# Patient Record
Sex: FEMALE | Race: WHITE | NOT HISPANIC OR LATINO | Employment: FULL TIME | URBAN - METROPOLITAN AREA
[De-identification: names, ages, dates, MRNs, and addresses within clinical notes are randomized per-mention and may not be internally consistent; named-entity substitution may affect disease eponyms.]

---

## 2023-12-27 PROBLEM — R45.7 CAREGIVER STRESS SYNDROME: Status: ACTIVE | Noted: 2023-05-24

## 2023-12-27 PROBLEM — K60.2 ANAL FISSURE: Status: ACTIVE | Noted: 2023-05-24

## 2023-12-27 PROBLEM — J45.20 MILD INTERMITTENT ASTHMA WITHOUT COMPLICATION (HHS-HCC): Status: ACTIVE | Noted: 2019-04-03

## 2023-12-27 PROBLEM — F43.23 SITUATIONAL MIXED ANXIETY AND DEPRESSIVE DISORDER: Status: ACTIVE | Noted: 2023-05-24

## 2023-12-27 PROBLEM — F43.89 CAREGIVER STRESS SYNDROME: Status: ACTIVE | Noted: 2023-05-24

## 2024-01-05 ENCOUNTER — OFFICE VISIT (OUTPATIENT)
Dept: PRIMARY CARE | Facility: CLINIC | Age: 54
End: 2024-01-05
Payer: COMMERCIAL

## 2024-01-05 ENCOUNTER — LAB (OUTPATIENT)
Dept: LAB | Facility: LAB | Age: 54
End: 2024-01-05
Payer: COMMERCIAL

## 2024-01-05 VITALS
HEIGHT: 66 IN | BODY MASS INDEX: 47.09 KG/M2 | WEIGHT: 293 LBS | DIASTOLIC BLOOD PRESSURE: 84 MMHG | RESPIRATION RATE: 18 BRPM | TEMPERATURE: 97.1 F | SYSTOLIC BLOOD PRESSURE: 122 MMHG | OXYGEN SATURATION: 96 % | HEART RATE: 74 BPM

## 2024-01-05 DIAGNOSIS — F32.A ANXIETY AND DEPRESSION: ICD-10-CM

## 2024-01-05 DIAGNOSIS — Z00.00 ANNUAL PHYSICAL EXAM: ICD-10-CM

## 2024-01-05 DIAGNOSIS — Z12.11 COLON CANCER SCREENING: ICD-10-CM

## 2024-01-05 DIAGNOSIS — Z23 IMMUNIZATION DUE: ICD-10-CM

## 2024-01-05 DIAGNOSIS — I10 ESSENTIAL HYPERTENSION: ICD-10-CM

## 2024-01-05 DIAGNOSIS — E66.01 CLASS 3 SEVERE OBESITY DUE TO EXCESS CALORIES WITH SERIOUS COMORBIDITY AND BODY MASS INDEX (BMI) OF 45.0 TO 49.9 IN ADULT (MULTI): ICD-10-CM

## 2024-01-05 DIAGNOSIS — F41.9 ANXIETY AND DEPRESSION: ICD-10-CM

## 2024-01-05 DIAGNOSIS — Z00.00 ANNUAL PHYSICAL EXAM: Primary | ICD-10-CM

## 2024-01-05 PROBLEM — E66.813 CLASS 3 SEVERE OBESITY DUE TO EXCESS CALORIES WITH SERIOUS COMORBIDITY AND BODY MASS INDEX (BMI) OF 45.0 TO 49.9 IN ADULT: Status: ACTIVE | Noted: 2024-01-05

## 2024-01-05 LAB
ALBUMIN SERPL BCP-MCNC: 4 G/DL (ref 3.4–5)
ALP SERPL-CCNC: 56 U/L (ref 33–110)
ALT SERPL W P-5'-P-CCNC: 24 U/L (ref 7–45)
ANION GAP SERPL CALC-SCNC: 10 MMOL/L (ref 10–20)
AST SERPL W P-5'-P-CCNC: 21 U/L (ref 9–39)
BILIRUB SERPL-MCNC: 0.4 MG/DL (ref 0–1.2)
BUN SERPL-MCNC: 17 MG/DL (ref 6–23)
CALCIUM SERPL-MCNC: 9.1 MG/DL (ref 8.6–10.3)
CHLORIDE SERPL-SCNC: 104 MMOL/L (ref 98–107)
CHOLEST SERPL-MCNC: 199 MG/DL (ref 0–199)
CHOLESTEROL/HDL RATIO: 4
CO2 SERPL-SCNC: 28 MMOL/L (ref 21–32)
CREAT SERPL-MCNC: 0.76 MG/DL (ref 0.5–1.05)
ERYTHROCYTE [DISTWIDTH] IN BLOOD BY AUTOMATED COUNT: 11.9 % (ref 11.5–14.5)
EST. AVERAGE GLUCOSE BLD GHB EST-MCNC: 91 MG/DL
GFR SERPL CREATININE-BSD FRML MDRD: >90 ML/MIN/1.73M*2
GLUCOSE SERPL-MCNC: 94 MG/DL (ref 74–99)
HBA1C MFR BLD: 4.8 %
HCT VFR BLD AUTO: 37.8 % (ref 36–46)
HDLC SERPL-MCNC: 49.8 MG/DL
HGB BLD-MCNC: 12.7 G/DL (ref 12–16)
LDLC SERPL CALC-MCNC: 129 MG/DL
MCH RBC QN AUTO: 33 PG (ref 26–34)
MCHC RBC AUTO-ENTMCNC: 33.6 G/DL (ref 32–36)
MCV RBC AUTO: 98 FL (ref 80–100)
NON HDL CHOLESTEROL: 149 MG/DL (ref 0–149)
NRBC BLD-RTO: 0 /100 WBCS (ref 0–0)
PLATELET # BLD AUTO: 236 X10*3/UL (ref 150–450)
POTASSIUM SERPL-SCNC: 4.4 MMOL/L (ref 3.5–5.3)
PROT SERPL-MCNC: 6.6 G/DL (ref 6.4–8.2)
RBC # BLD AUTO: 3.85 X10*6/UL (ref 4–5.2)
SODIUM SERPL-SCNC: 138 MMOL/L (ref 136–145)
TRIGL SERPL-MCNC: 99 MG/DL (ref 0–149)
VLDL: 20 MG/DL (ref 0–40)
WBC # BLD AUTO: 7.4 X10*3/UL (ref 4.4–11.3)

## 2024-01-05 PROCEDURE — 80061 LIPID PANEL: CPT

## 2024-01-05 PROCEDURE — 36415 COLL VENOUS BLD VENIPUNCTURE: CPT

## 2024-01-05 PROCEDURE — 1036F TOBACCO NON-USER: CPT | Performed by: PHYSICIAN ASSISTANT

## 2024-01-05 PROCEDURE — 3008F BODY MASS INDEX DOCD: CPT | Performed by: PHYSICIAN ASSISTANT

## 2024-01-05 PROCEDURE — 85027 COMPLETE CBC AUTOMATED: CPT

## 2024-01-05 PROCEDURE — 90480 ADMN SARSCOV2 VAC 1/ONLY CMP: CPT | Performed by: PHYSICIAN ASSISTANT

## 2024-01-05 PROCEDURE — 3074F SYST BP LT 130 MM HG: CPT | Performed by: PHYSICIAN ASSISTANT

## 2024-01-05 PROCEDURE — 80053 COMPREHEN METABOLIC PANEL: CPT

## 2024-01-05 PROCEDURE — 99386 PREV VISIT NEW AGE 40-64: CPT | Performed by: PHYSICIAN ASSISTANT

## 2024-01-05 PROCEDURE — 91322 SARSCOV2 VAC 50 MCG/0.5ML IM: CPT | Performed by: PHYSICIAN ASSISTANT

## 2024-01-05 PROCEDURE — 83036 HEMOGLOBIN GLYCOSYLATED A1C: CPT

## 2024-01-05 PROCEDURE — 3079F DIAST BP 80-89 MM HG: CPT | Performed by: PHYSICIAN ASSISTANT

## 2024-01-05 RX ORDER — LISINOPRIL 40 MG/1
40 TABLET ORAL
Qty: 90 TABLET | Refills: 1 | Status: SHIPPED | OUTPATIENT
Start: 2024-01-05 | End: 2024-06-03

## 2024-01-05 RX ORDER — SPIRONOLACTONE 100 MG/1
100 TABLET, FILM COATED ORAL
COMMUNITY
Start: 2023-04-07 | End: 2024-01-05 | Stop reason: SDUPTHER

## 2024-01-05 RX ORDER — LISINOPRIL 40 MG/1
40 TABLET ORAL
COMMUNITY
Start: 2023-04-07 | End: 2024-01-05 | Stop reason: SDUPTHER

## 2024-01-05 RX ORDER — METOPROLOL SUCCINATE 200 MG/1
400 TABLET, EXTENDED RELEASE ORAL DAILY
Qty: 180 TABLET | Refills: 1 | Status: SHIPPED | OUTPATIENT
Start: 2024-01-05 | End: 2024-06-03

## 2024-01-05 RX ORDER — SPIRONOLACTONE 100 MG/1
100 TABLET, FILM COATED ORAL
Qty: 90 TABLET | Refills: 1 | Status: SHIPPED | OUTPATIENT
Start: 2024-01-05 | End: 2024-06-03

## 2024-01-05 RX ORDER — SPIRONOLACTONE 100 MG/1
100 TABLET, FILM COATED ORAL DAILY
COMMUNITY
End: 2024-01-05 | Stop reason: SDUPTHER

## 2024-01-05 RX ORDER — AMLODIPINE BESYLATE 10 MG/1
10 TABLET ORAL
Qty: 90 TABLET | Refills: 1 | Status: SHIPPED | OUTPATIENT
Start: 2024-01-05 | End: 2024-06-03

## 2024-01-05 RX ORDER — SPIRONOLACTONE 100 MG/1
100 TABLET, FILM COATED ORAL DAILY
Qty: 30 TABLET | Refills: 0 | Status: SHIPPED | OUTPATIENT
Start: 2024-01-05 | End: 2024-01-05 | Stop reason: SDUPTHER

## 2024-01-05 RX ORDER — ALBUTEROL SULFATE 90 UG/1
2 AEROSOL, METERED RESPIRATORY (INHALATION) EVERY 6 HOURS PRN
COMMUNITY
Start: 2019-04-03

## 2024-01-05 RX ORDER — AMLODIPINE BESYLATE 10 MG/1
10 TABLET ORAL
COMMUNITY
Start: 2023-04-07 | End: 2024-01-05 | Stop reason: SDUPTHER

## 2024-01-05 RX ORDER — BISMUTH SUBSALICYLATE 262 MG
1 TABLET,CHEWABLE ORAL DAILY
COMMUNITY

## 2024-01-05 RX ORDER — METOPROLOL SUCCINATE 200 MG/1
2 TABLET, EXTENDED RELEASE ORAL DAILY
COMMUNITY
Start: 2023-04-07 | End: 2024-01-05 | Stop reason: SDUPTHER

## 2024-01-05 ASSESSMENT — ENCOUNTER SYMPTOMS
DYSPHORIC MOOD: 1
CONSTIPATION: 1
NERVOUS/ANXIOUS: 1

## 2024-01-05 NOTE — ASSESSMENT & PLAN NOTE
- Current weight: 133 kg (294 lb)  - Weight loss needed to achieve BMI 25: 140.6 Lbs  - Weight loss needed to achieve BMI 30: 109.9 Lbs  - Discussed at length today - recommended strategies for weight loss. Advised seated exercise regimen. Advised dietary modification - reduce caloric intake.   - Recommended try her best for the next 3 months with lifestyle modifications then follow up with me for weight check - we can discuss medication options at that time   - Pt is agreeable to this plan

## 2024-01-05 NOTE — ASSESSMENT & PLAN NOTE
- Current stressors: taking care of her mom with dementia - stuck in the house a lot, recent breakup with partner of 10 years.   - Sxs are very bothersome   - Nervous to try medications due to side effect concerns   - She is open to counseling but would prefer virtually since she has to be home with her mom.   - Referral placed to our behavioral health team/ Jacy for counseling

## 2024-01-05 NOTE — PROGRESS NOTES
"Subjective   Patient ID: Phyllis Holbrook is a 53 y.o. female who presents for Establish Care (New pt here today to [facility]; previously seen [facility] doctors, states has had numerous and then they leave. Pt needing refills-pending; the Spironolactone needs to go to both mailway and local-pening for both. ).    HPI     Preventive:   - Lives at home in [place] with mom (dementia) and son - it's ok. 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 Drinks water, also coffee, one diet soda a day at dinner.   - Exercise: sedentary   - Sexual hx: not right now - broke up with partner   - Alcohol: infrequent      Anal fissure   - due to constipation  - is finally starting to resolve     Anxiety/ depression:  - Tried Wellbutrin in the past - d/c'd due to constipation   - Tried another medicine in the past but can't remember name - no side effect but wanted it to work better than it did   - Tried counseling in past years but not recently - with her mom it's harder     Review of Systems   Gastrointestinal:  Positive for constipation.   Psychiatric/Behavioral:  Positive for dysphoric mood. The patient is nervous/anxious.        Objective   /84   Pulse 74   Temp 36.2 °C (97.1 °F)   Resp 18   Ht 1.67 m (5' 5.75\")   Wt 133 kg (294 lb)   SpO2 96%   BMI 47.81 kg/m²     Physical Exam  Constitutional:       General: She is not in acute distress.     Appearance: She is obese.   HENT:      Head: Normocephalic.      Right Ear: Tympanic membrane and ear canal normal.      Left Ear: Tympanic membrane and ear canal normal.      Nose: Nose normal.      Mouth/Throat:      Mouth: Mucous membranes are moist.      " Pharynx: Oropharynx is clear.   Eyes:      Extraocular Movements: Extraocular movements intact.      Conjunctiva/sclera: Conjunctivae normal.      Pupils: Pupils are equal, round, and reactive to light.   Cardiovascular:      Rate and Rhythm: Normal rate and regular rhythm.      Pulses: Normal pulses.      Heart sounds: No murmur heard.  Pulmonary:      Effort: Pulmonary effort is normal.      Breath sounds: Normal breath sounds. No wheezing, rhonchi or rales.   Abdominal:      General: Bowel sounds are normal. There is no distension.      Palpations: Abdomen is soft. There is no mass.      Tenderness: There is no abdominal tenderness. There is no guarding.   Musculoskeletal:         General: Normal range of motion.      Cervical back: Neck supple.   Lymphadenopathy:      Cervical: No cervical adenopathy.   Skin:     General: Skin is warm and dry.      Findings: No lesion or rash.   Neurological:      General: No focal deficit present.      Mental Status: She is alert.      Gait: Gait normal.   Psychiatric:         Mood and Affect: Mood normal.       Assessment/Plan     Problem List Items Addressed This Visit       Essential hypertension    Overview     - Has been stable and well controlled on amlodipine 10 mg, lisinopril 40 mg, metoprolol  mg and spironolactone 100 mg          Current Assessment & Plan     - Continue current tx plan          Relevant Medications    spironolactone (Aldactone) 100 mg tablet    metoprolol succinate XL (Toprol-XL) 200 mg 24 hr tablet    lisinopril 40 mg tablet    amLODIPine (Norvasc) 10 mg tablet    Other Relevant Orders    CBC (Completed)    Comprehensive Metabolic Panel    Hemoglobin A1C    Lipid Panel    Anxiety and depression    Overview     - Historical med: Wellbutrin (constipation)          Current Assessment & Plan     - Current stressors: taking care of her mom with dementia - stuck in the house a lot, recent breakup with partner of 10 years.   - Sxs are very bothersome    - Nervous to try medications due to side effect concerns   - She is open to counseling but would prefer virtually since she has to be home with her mom.   - Referral placed to our behavioral health team/ Jacy for counseling            Relevant Orders    Follow Up In Advanced Primary Care - Behavioral Health Collaborative Care CoCM    Class 3 severe obesity due to excess calories with serious comorbidity and body mass index (BMI) of 45.0 to 49.9 in adult (CMS/[facility])    Current Assessment & Plan     - Current weight: 133 kg (294 lb)  - Weight loss needed to achieve BMI 25: 140.6 Lbs  - Weight loss needed to achieve BMI 30: 109.9 Lbs  - Discussed at length today - recommended strategies for weight loss. Advised seated exercise regimen. Advised dietary modification - reduce caloric intake.   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 Increase healthy alternatives, whole grains, fruits vegetables.  - Encouraged to take daily multivitamin.    - Discussed EXERCISE -   Recommended weight training for bone health and 30 minutes of  cardiovascular exercise 5-7 days a week.  - Encouraged pt to get yearly eye and dental exams          Relevant Orders    CBC (Completed)    Comprehensive Metabolic Panel    Hemoglobin A1C    Lipid Panel     Other Visit Diagnoses       Colon cancer screening        Relevant Orders    Colonoscopy Screening; Average Risk Patient    Immunization due        Relevant Orders    Moderna COVID-19 vaccine, [id number], monovalent, age 12 years and older, (50mcg/0.5mL) (Completed)

## 2024-01-05 NOTE — ASSESSMENT & PLAN NOTE
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 Increase healthy alternatives, whole grains, fruits vegetables.  - Encouraged to take daily multivitamin.    - Discussed EXERCISE -   Recommended weight training for bone health and 30 minutes of cardiovascular exercise 5-7 days a week.  - Encouraged pt to get yearly eye and dental exams

## 2024-02-01 ENCOUNTER — TELEPHONE (OUTPATIENT)
Dept: PRIMARY CARE | Facility: CLINIC | Age: 54
End: 2024-02-01
Payer: COMMERCIAL

## 2024-02-01 NOTE — PROGRESS NOTES
Writer outreached pt regarding their referral to Collaborative Care. Explained program and answered pt's questions. Pt requested to schedule future initial assessment. Pt is scheduled for Monday March 25th at 9:30am via phone.

## 2024-03-25 ENCOUNTER — SOCIAL WORK (OUTPATIENT)
Dept: PRIMARY CARE | Facility: CLINIC | Age: 54
End: 2024-03-25
Payer: COMMERCIAL

## 2024-03-25 DIAGNOSIS — F33.9 MAJOR DEPRESSIVE DISORDER, RECURRENT EPISODE WITH ANXIOUS DISTRESS (CMS-HCC): Primary | ICD-10-CM

## 2024-03-25 ASSESSMENT — ANXIETY QUESTIONNAIRES
GAD7 TOTAL SCORE: 3
1. FEELING NERVOUS, ANXIOUS, OR ON EDGE: MORE THAN HALF THE DAYS
2. NOT BEING ABLE TO STOP OR CONTROL WORRYING: NOT AT ALL
6. BECOMING EASILY ANNOYED OR IRRITABLE: SEVERAL DAYS
5. BEING SO RESTLESS THAT IT IS HARD TO SIT STILL: NOT AT ALL
7. FEELING AFRAID AS IF SOMETHING AWFUL MIGHT HAPPEN: NOT AT ALL
3. WORRYING TOO MUCH ABOUT DIFFERENT THINGS: NOT AT ALL
IF YOU CHECKED OFF ANY PROBLEMS ON THIS QUESTIONNAIRE, HOW DIFFICULT HAVE THESE PROBLEMS MADE IT FOR YOU TO DO YOUR WORK, TAKE CARE OF THINGS AT HOME, OR GET ALONG WITH OTHER PEOPLE: NOT DIFFICULT AT ALL
4. TROUBLE RELAXING: NOT AT ALL

## 2024-03-25 ASSESSMENT — PATIENT HEALTH QUESTIONNAIRE - PHQ9
9. THOUGHTS THAT YOU WOULD BE BETTER OFF DEAD, OR OF HURTING YOURSELF: NOT AT ALL
3. TROUBLE FALLING OR STAYING ASLEEP: MORE THAN HALF THE DAYS
7. TROUBLE CONCENTRATING ON THINGS, SUCH AS READING THE NEWSPAPER OR WATCHING TELEVISION: MORE THAN HALF THE DAYS
SUM OF ALL RESPONSES TO PHQ QUESTIONS 1-9: 13
SUM OF ALL RESPONSES TO PHQ9 QUESTIONS 1 & 2: 2
4. FEELING TIRED OR HAVING LITTLE ENERGY: NEARLY EVERY DAY
2. FEELING DOWN, DEPRESSED OR HOPELESS: SEVERAL DAYS
10. IF YOU CHECKED OFF ANY PROBLEMS, HOW DIFFICULT HAVE THESE PROBLEMS MADE IT FOR YOU TO DO YOUR WORK, TAKE CARE OF THINGS AT HOME, OR GET ALONG WITH OTHER PEOPLE: VERY DIFFICULT
5. POOR APPETITE OR OVEREATING: SEVERAL DAYS
8. MOVING OR SPEAKING SO SLOWLY THAT OTHER PEOPLE COULD HAVE NOTICED. OR THE OPPOSITE, BEING SO FIGETY OR RESTLESS THAT YOU HAVE BEEN MOVING AROUND A LOT MORE THAN USUAL: NOT AT ALL
1. LITTLE INTEREST OR PLEASURE IN DOING THINGS: SEVERAL DAYS
6. FEELING BAD ABOUT YOURSELF - OR THAT YOU ARE A FAILURE OR HAVE LET YOURSELF OR YOUR FAMILY DOWN: NEARLY EVERY DAY
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
## 2024-03-30 ENCOUNTER — DOCUMENTATION (OUTPATIENT)
Dept: PRIMARY CARE | Facility: CLINIC | Age: 54
End: 2024-03-30
Payer: COMMERCIAL

## 2024-03-30 DIAGNOSIS — F41.9 ANXIETY AND DEPRESSION: Primary | ICD-10-CM

## 2024-03-30 DIAGNOSIS — F32.A ANXIETY AND DEPRESSION: Primary | ICD-10-CM

## 2024-03-30 PROCEDURE — 99492 1ST PSYC COLLAB CARE MGMT: CPT | Performed by: FAMILY MEDICINE

## 2024-04-04 ENCOUNTER — DOCUMENTATION (OUTPATIENT)
Dept: BEHAVIORAL HEALTH | Facility: CLINIC | Age: 54
End: 2024-04-04
Payer: COMMERCIAL

## 2024-04-04 NOTE — PROGRESS NOTES
[facility] Psychiatry Consult Note     Phyllis Holbrook is a 53 y.o., referred to Collaborative Care for symptoms of depression and anxiety in setting of ending romantic relationship and taking care of mother whose health is worsening. I have reviewed the patient with the behavioral health manager and reviewed the patient's electronic record.    Past Meds:  Paxil 20 years ago and felt it worked to help with depression 20 years ago  Wellbutrin XL caused severe constipation    Recommendations:   Start sertraline 50mg daily for depression, can increase by 50mg daily every 4 weeks to max of 200mg or until symptoms have resolved      Patient Health Questionnaire-9 Score: 13 (3/25/2024  9:41 AM)  ALEX-7 Total Score: 3 (3/25/2024  9:49 AM)      The above treatment considerations and suggestions are based on consultations with the patient's care manager and a review of information available in the electronic medical record. I have not personally examined the patient. All recommendations should be implemented with consideration of the patient's relevant prior history and current clinical status. Please feel free to call me with any questions about the care of this patient.

## 2024-04-05 ENCOUNTER — OFFICE VISIT (OUTPATIENT)
Dept: PRIMARY CARE | Facility: CLINIC | Age: 54
End: 2024-04-05
Payer: COMMERCIAL

## 2024-04-05 VITALS
DIASTOLIC BLOOD PRESSURE: 72 MMHG | HEART RATE: 75 BPM | TEMPERATURE: 96 F | SYSTOLIC BLOOD PRESSURE: 102 MMHG | WEIGHT: 291.4 LBS | RESPIRATION RATE: 16 BRPM | BODY MASS INDEX: 46.83 KG/M2 | OXYGEN SATURATION: 97 % | HEIGHT: 66 IN

## 2024-04-05 DIAGNOSIS — F41.9 ANXIETY AND DEPRESSION: ICD-10-CM

## 2024-04-05 DIAGNOSIS — E66.01 CLASS 3 SEVERE OBESITY DUE TO EXCESS CALORIES WITH SERIOUS COMORBIDITY AND BODY MASS INDEX (BMI) OF 45.0 TO 49.9 IN ADULT (MULTI): Primary | ICD-10-CM

## 2024-04-05 DIAGNOSIS — F32.A ANXIETY AND DEPRESSION: ICD-10-CM

## 2024-04-05 PROCEDURE — 1036F TOBACCO NON-USER: CPT | Performed by: PHYSICIAN ASSISTANT

## 2024-04-05 PROCEDURE — 99213 OFFICE O/P EST LOW 20 MIN: CPT | Performed by: PHYSICIAN ASSISTANT

## 2024-04-05 PROCEDURE — 3078F DIAST BP <80 MM HG: CPT | Performed by: PHYSICIAN ASSISTANT

## 2024-04-05 PROCEDURE — 3008F BODY MASS INDEX DOCD: CPT | Performed by: PHYSICIAN ASSISTANT

## 2024-04-05 PROCEDURE — 3074F SYST BP LT 130 MM HG: CPT | Performed by: PHYSICIAN ASSISTANT

## 2024-04-05 RX ORDER — PAROXETINE 10 MG/1
10 TABLET, FILM COATED ORAL EVERY MORNING
Qty: 30 TABLET | Refills: 1 | Status: SHIPPED | OUTPATIENT
Start: 2024-04-05 | End: 2024-06-06

## 2024-04-05 ASSESSMENT — ENCOUNTER SYMPTOMS: DYSPHORIC MOOD: 1

## 2024-04-05 NOTE — ASSESSMENT & PLAN NOTE
- Follow up visit today - she has started seeing Jacy - had her first visit and feels great about it! Good connection with Jacy.   - I reviewed Dr. Swanson suggestion with her of starting Sertarline - she is too nervous to start this.   - She does recall taking Paxil many years ago without any side effects and would like to try this again   - Rx sent for Paxil 10 mg daily and did warn her about potential for it to wear off in the evenings and to let me know if she's noticing this   - Recommend she continue to follow up with Jacy.   - We can taper up the Paxil every 4-6 weeks as needed.

## 2024-04-05 NOTE — PATIENT INSTRUCTIONS
Please give your insurance a call and ask if they cover any of the below:  Wegovy   Zepbound  Saxend (daily injection)

## 2024-04-05 NOTE — ASSESSMENT & PLAN NOTE
- Current weight: 132 kg (291 lb 6.4 oz)  - Weight change since last visit (-) denotes wt loss -2.6 lbs   - Weight loss needed to achieve BMI 25: 138 Lbs  - Weight loss needed to achieve BMI 30: 107.3 Lbs  - I congratulated the pt on the 2.6lb weight loss.   - Pt admits she is still struggling greatly to eat healthy and exercise. Lifestyle modifications were strongly encouraged  - Referral placed to nutritionist for further discussion of diet   - Discussed weight loss medications, especially GLP1-ra's e.g. Wegovy, Saxenda, Zepbound - pt will call her insurance company to see if any of these are covered.

## 2024-04-05 NOTE — PROGRESS NOTES
"Subjective   Patient ID: Phyllis Holbrook is a 53 y.o. female who presents for 3 month folllow up.    HPI     Follow up for anxiety and depression  - Saw Jacy once - likes her   - Does want to try meds   - Dr. Swanson suggests Sertraline      Follow up for obesity   - Has been struggling to be consistent with healthy diet and exercise   - Has lost 2.6lbs though     Review of Systems   Psychiatric/Behavioral:  Positive for dysphoric mood.        Objective   /72 (BP Location: Right arm, Patient Position: Sitting, BP Cuff Size: Large adult)   Pulse 75   Temp 35.6 °C (96 °F) (Temporal)   Resp 16   Ht 1.67 m (5' 5.75\")   Wt 132 kg (291 lb 6.4 oz)   SpO2 97%   BMI 47.39 kg/m²     Physical Exam  Constitutional:       Appearance: Normal appearance. She is obese.   Neurological:      Mental Status: She is alert.   Psychiatric:         Mood and Affect: Mood normal.         Behavior: Behavior normal.         Thought Content: Thought content normal.         Judgment: Judgment normal.         Assessment/Plan     Problem List Items Addressed This Visit       Anxiety and depression    Overview     - Historical med: Wellbutrin (constipation)          Current Assessment & Plan     - Follow up visit today - she has started seeing Jacy - had her first visit and feels great about it! Good connection with Jacy.   - I reviewed Dr. Swanson suggestion with her of starting Sertarline - she is too nervous to start this.   - She does recall taking Paxil many years ago without any side effects and would like to try this again   - Rx sent for Paxil 10 mg daily and did warn her about potential for it to wear off in the evenings and to let me know if she's noticing this   - Recommend she continue to follow up with Jacy.   - We can taper up the Paxil every 4-6 weeks as needed.          Relevant Medications    PARoxetine (Paxil) 10 mg tablet    Class 3 severe obesity due to excess calories with serious comorbidity and body " mass index (BMI) of 45.0 to 49.9 in adult (CMS/formerly [facility]) - Primary    Current Assessment & Plan     - Current weight: 132 kg (291 lb 6.4 oz)  - Weight change since last visit (-) denotes wt loss -2.6 lbs   - Weight loss needed to achieve BMI 25: 138 Lbs  - Weight loss needed to achieve BMI 30: 107.3 Lbs  - I congratulated the pt on the 2.6lb weight loss.   - Pt admits she is still struggling greatly to eat healthy and exercise. Lifestyle modifications were strongly encouraged  - Referral placed to nutritionist for further discussion of diet   - Discussed weight loss medications, especially GLP1-ra's e.g. Wegovy, Saxenda, Zepbound - pt will call her insurance company to see if any of these are covered.          Relevant Orders    Referral to Nutrition Services

## 2024-04-08 DIAGNOSIS — E66.01 CLASS 3 SEVERE OBESITY DUE TO EXCESS CALORIES WITH SERIOUS COMORBIDITY AND BODY MASS INDEX (BMI) OF 45.0 TO 49.9 IN ADULT (MULTI): Primary | ICD-10-CM

## 2024-04-08 DIAGNOSIS — I10 ESSENTIAL HYPERTENSION: ICD-10-CM

## 2024-04-08 RX ORDER — SEMAGLUTIDE 0.25 MG/.5ML
0.25 INJECTION, SOLUTION SUBCUTANEOUS
Qty: 2 ML | Refills: 3 | Status: SHIPPED | OUTPATIENT
Start: 2024-04-14 | End: 2024-04-19

## 2024-04-10 ENCOUNTER — SOCIAL WORK (OUTPATIENT)
Dept: PRIMARY CARE | Facility: CLINIC | Age: 54
End: 2024-04-10
Payer: COMMERCIAL

## 2024-04-10 NOTE — PROGRESS NOTES
"Collaborative Care (CoCM)  Progress Note    Type of Interaction: Virtual    Start Time: 12:33pm    End Time: 1:27pm    Appointment: Not Scheduled    Reason for Visit:   Chief Complaint   Patient presents with    Depression    Anxiety      Interval History / Patient Symptoms:     Occ sub opt, \"there is now a dim light at the end of the tunnel\".     Interventions Provided: [place] Setting, Psychoeducation, Acceptance & Commitment Therapy, Motivational Interviewing, Solution Focused Therapy, Strengths Exploration, Communication Training, Review Progress/Goals Stress Management, Mindfulness, and Treatment Planning    Progress Made: Moderate    Response to Intervention: Patient shared that after meeting with her PCP she decided to go back on Paxil due to no side effects on this medication the last time she was prescribed. Aware of alternative recommendations in chart for Sertraline in the event that she doesn't feel that she is obtaining sxs relief. Per patient, her biggest challenge is not having any alone time/self-care time. Works during the day and takes care of her mother all evening. Children do not offer support, sometimes will help when she asks. Patient addressed her concerns with placing her mother in a nursing home. Recalled past negative experiences. Per patient, she feels as if she would be able to cope more so with caregiver burnout if she had a couple hours on the weekend and a couple hours during the week to herself. Patient shared a hx of avoiding conflict. Recalled times where she has been assertive and how empowering this felt. Provided patient with caregiver resources.     Plan: Continue to encourage patient to link with caregiver support group. Complete PHQ/ALEX at next appt, patient on medication for two weeks at this point. Problem solve coping skills to deal with stressors.     Patient Instructions   Patient is scheduled for VV follow up on 4/25 @ 11:30am    Follow Up / Next Appointment: Next " appointment: 04/25/24

## 2024-04-16 ENCOUNTER — TELEPHONE (OUTPATIENT)
Dept: PRIMARY CARE | Facility: CLINIC | Age: 54
End: 2024-04-16
Payer: COMMERCIAL

## 2024-04-16 NOTE — TELEPHONE ENCOUNTER
Prior authorization approved for Wegovy. Spoke with patient to notify of approval. Patient is to call pharmacy to notify of prior authorization approval. If additional assistance is needed, advised patient to call [facility] at [phone].

## 2024-04-17 DIAGNOSIS — I10 ESSENTIAL HYPERTENSION: ICD-10-CM

## 2024-04-17 DIAGNOSIS — E66.01 CLASS 3 SEVERE OBESITY DUE TO EXCESS CALORIES WITH SERIOUS COMORBIDITY AND BODY MASS INDEX (BMI) OF 45.0 TO 49.9 IN ADULT (MULTI): ICD-10-CM

## 2024-04-19 RX ORDER — SEMAGLUTIDE 0.25 MG/.5ML
INJECTION, SOLUTION SUBCUTANEOUS
Qty: 2 ML | Refills: 3 | Status: SHIPPED | OUTPATIENT
Start: 2024-04-19

## 2024-04-25 ENCOUNTER — APPOINTMENT (OUTPATIENT)
Dept: PRIMARY CARE | Facility: CLINIC | Age: 54
End: 2024-04-25
Payer: COMMERCIAL

## 2024-04-29 ENCOUNTER — TELEPHONE (OUTPATIENT)
Dept: PRIMARY CARE | Facility: CLINIC | Age: 54
End: 2024-04-29
Payer: COMMERCIAL

## 2024-04-29 NOTE — PROGRESS NOTES
Outreached patient to r/s cancelled appt, scheduled for 5/17 @ 2:30pm in person. Patient is aware of the new office location.

## 2024-04-30 PROCEDURE — 99493 SBSQ PSYC COLLAB CARE MGMT: CPT | Performed by: FAMILY MEDICINE

## 2024-05-01 ENCOUNTER — DOCUMENTATION (OUTPATIENT)
Dept: PRIMARY CARE | Facility: CLINIC | Age: 54
End: 2024-05-01
Payer: COMMERCIAL

## 2024-05-01 DIAGNOSIS — F32.A ANXIETY AND DEPRESSION: Primary | ICD-10-CM

## 2024-05-01 DIAGNOSIS — F41.9 ANXIETY AND DEPRESSION: Primary | ICD-10-CM

## 2024-05-13 ENCOUNTER — TELEPHONE (OUTPATIENT)
Dept: PRIMARY CARE | Facility: CLINIC | Age: 54
End: 2024-05-13
Payer: COMMERCIAL

## 2024-05-13 NOTE — TELEPHONE ENCOUNTER
----- Message from Payton Garland PA-C sent at 5/13/2024  4:05 PM EDT -----  Phyllis Holbrook never go her colonoscopy scheduled. Can you ask her if she needs help scheduling?   Thanks,   MARINA Cain

## 2024-05-13 NOTE — TELEPHONE ENCOUNTER
Was unable to schedule colonoscopy, [facility]

## 2024-05-17 ENCOUNTER — SOCIAL WORK (OUTPATIENT)
Dept: PRIMARY CARE | Facility: CLINIC | Age: 54
End: 2024-05-17
Payer: COMMERCIAL

## 2024-05-17 NOTE — PROGRESS NOTES
Collaborative Care (CoCM)  Progress Note    Type of Interaction: In Office    Start Time: 1:30pm    End Time: 2:23pm    Appointment: Not Scheduled    Reason for Visit:   Chief Complaint   Patient presents with    Depression    Anxiety      Interval History / Patient Symptoms:     Grief     Interventions Provided: Acceptance & Commitment Therapy, Motivational Interviewing, Strengths Exploration, Grief Work, Develop Coping Strategies, Review Progress/Goals Stress Management, Mindfulness, and Treatment Planning    Progress Made: Moderate    Response to Intervention: Patient shared that since our last session her ex  passed away. Her daughter who is 23 yo was the medical power of . Due to the difficulties of this decision, patient was the person to make the call on him coming off the ventilator. Processed through her emotions associated with this loss. Worried about her children and attempting to be as supportive as she can. Helping with service arrangements. Patient reported that she stopped her medication, shared that she believes she was becoming constipated again from the med. Had similar side effect in the past. Expressed wishing to attempt managing her mental health without medication at this time. Is taking medication to support her weight loss. Has been attempting to walk more and watch what she eats. Provided [employer] resources for accountability. Writer encouraged nourishing meaningful relationships, moving body in a meaningful way, healthy balanced nutrition, a regular sleep schedule, and overall increased self-care to maintain mental health stability.      Plan: Continue to monitor mood and review psych recs if necessary. PHQ/ALEX at next appt.     Patient Instructions   Patient is scheduled for VV on 6/20 @ 12pm    Follow Up / Next Appointment: Next appointment: 06/20/24

## 2024-05-31 ENCOUNTER — DOCUMENTATION (OUTPATIENT)
Dept: PRIMARY CARE | Facility: CLINIC | Age: 54
End: 2024-05-31
Payer: COMMERCIAL

## 2024-05-31 DIAGNOSIS — F32.A ANXIETY AND DEPRESSION: Primary | ICD-10-CM

## 2024-05-31 DIAGNOSIS — I10 ESSENTIAL HYPERTENSION: ICD-10-CM

## 2024-05-31 DIAGNOSIS — F41.9 ANXIETY AND DEPRESSION: Primary | ICD-10-CM

## 2024-06-03 DIAGNOSIS — I10 ESSENTIAL HYPERTENSION: ICD-10-CM

## 2024-06-03 RX ORDER — METOPROLOL SUCCINATE 200 MG/1
400 TABLET, EXTENDED RELEASE ORAL DAILY
Qty: 180 TABLET | Refills: 0 | Status: SHIPPED | OUTPATIENT
Start: 2024-06-03

## 2024-06-03 RX ORDER — LISINOPRIL 40 MG/1
40 TABLET ORAL DAILY
Qty: 90 TABLET | Refills: 0 | Status: SHIPPED | OUTPATIENT
Start: 2024-06-03

## 2024-06-03 RX ORDER — AMLODIPINE BESYLATE 10 MG/1
10 TABLET ORAL DAILY
Qty: 90 TABLET | Refills: 0 | Status: SHIPPED | OUTPATIENT
Start: 2024-06-03

## 2024-06-03 RX ORDER — SPIRONOLACTONE 100 MG/1
100 TABLET, FILM COATED ORAL DAILY
Qty: 90 TABLET | Refills: 0 | Status: SHIPPED | OUTPATIENT
Start: 2024-06-03

## 2024-06-06 DIAGNOSIS — F32.A ANXIETY AND DEPRESSION: ICD-10-CM

## 2024-06-06 DIAGNOSIS — F41.9 ANXIETY AND DEPRESSION: ICD-10-CM

## 2024-06-06 RX ORDER — PAROXETINE 10 MG/1
TABLET, FILM COATED ORAL
Qty: 30 TABLET | Refills: 1 | Status: SHIPPED | OUTPATIENT
Start: 2024-06-06

## 2024-06-17 DIAGNOSIS — E66.9 OBESITY, UNSPECIFIED CLASSIFICATION, UNSPECIFIED OBESITY TYPE, UNSPECIFIED WHETHER SERIOUS COMORBIDITY PRESENT: Primary | ICD-10-CM

## 2024-06-18 RX ORDER — SEMAGLUTIDE 0.5 MG/.5ML
0.5 INJECTION, SOLUTION SUBCUTANEOUS
Qty: 6 ML | Refills: 0 | Status: SHIPPED | OUTPATIENT
Start: 2024-06-23 | End: 2024-06-19 | Stop reason: SDUPTHER

## 2024-06-19 DIAGNOSIS — E66.9 OBESITY, UNSPECIFIED CLASSIFICATION, UNSPECIFIED OBESITY TYPE, UNSPECIFIED WHETHER SERIOUS COMORBIDITY PRESENT: ICD-10-CM

## 2024-06-19 RX ORDER — SEMAGLUTIDE 0.5 MG/.5ML
0.5 INJECTION, SOLUTION SUBCUTANEOUS
Qty: 6 ML | Refills: 0 | Status: SHIPPED | OUTPATIENT
Start: 2024-06-23 | End: 2024-09-21

## 2024-06-20 ENCOUNTER — TELEPHONE (OUTPATIENT)
Dept: PHARMACY | Facility: HOSPITAL | Age: 54
End: 2024-06-20

## 2024-06-20 ENCOUNTER — APPOINTMENT (OUTPATIENT)
Dept: PRIMARY CARE | Facility: CLINIC | Age: 54
End: 2024-06-20
Payer: COMMERCIAL

## 2024-06-20 NOTE — TELEPHONE ENCOUNTER
Patient left [facility] a phone call at 5 pm on 6/19 requesting another prior authorization be completed for Wegovy.    [facility] LVM for patient today, 6/20, at 1:25 pm stating no PA is needed. Medication is covered by insurance ($75 for 84 day supply). Advised patient [facility] called pharmacy and medication is just out of stock. Recommended patient call other pharmacies to see if medication is in stock there, and to call [facility] back if she runs into any issues.    Thank you,  Candace Barnard, PharmD

## 2024-06-20 NOTE — PROGRESS NOTES
Collaborative Care (CoCM)  Progress Note    Type of Interaction: Virtual    Start Time: 12:00pm    End Time: 12:48pm    Appointment: Not Scheduled    Reason for Visit:   Chief Complaint   Patient presents with    Depression    Anxiety     Interval History / Patient Symptoms:     Occ sadness, crying spells have increased post stopping her medication.     Interventions Provided: Psychoeducation, Acceptance & Commitment Therapy, Motivational Interviewing, Strengths Exploration, Develop Coping Strategies, Review Progress/Goals Stress Management, Mindfulness, and Treatment Planning    Progress Made: Moderate    Response to Intervention: Per patient, she has been crying more since stopping her medication (Paxil) due to constipation. Reported that she has been struggling some with the loss of her ex , has trying to be supportive to her children. Patient did she progress with increasing her self care, reported that she has been keeping track of her steps with her fit bit, attempting to be more mindful of her diet, and spending time with friends/family outside the home more freq. Going to a [employer] game tomorrow with coworkers. Patient feels as if her mother needs more nursing support in the home, continues to decline in her eyes, doing things out of the ordinary due to confusion. Explored emotions associated with this change and possible future needs. Encouraged patient to continue journaling her food, does well with this during the day and snacks at night. Patient shared that she has a sweet tooth that is hard to control. Would consider meeting with a dietician, reported that in the past she has not been honest with the dietician. Encouraged patient to journal emotions as well in order to better process them.     Plan: PHQ/ALEX---patient to talk with PCP about possible starting psych recs, meets with PA 7/8    Patient Instructions   Patient is scheduled for telehealth follow up on 7/29 @ 12pm    Follow Up / Next  Appointment: Next appointment: 07/29/24

## 2024-06-24 ENCOUNTER — DOCUMENTATION (OUTPATIENT)
Dept: PRIMARY CARE | Facility: CLINIC | Age: 54
End: 2024-06-24
Payer: COMMERCIAL

## 2024-06-24 DIAGNOSIS — F41.9 ANXIETY AND DEPRESSION: Primary | ICD-10-CM

## 2024-06-24 DIAGNOSIS — F32.A ANXIETY AND DEPRESSION: Primary | ICD-10-CM

## 2024-06-24 PROCEDURE — 99493 SBSQ PSYC COLLAB CARE MGMT: CPT | Performed by: PHYSICIAN ASSISTANT

## 2024-07-08 ENCOUNTER — APPOINTMENT (OUTPATIENT)
Dept: PRIMARY CARE | Facility: CLINIC | Age: 54
End: 2024-07-08
Payer: COMMERCIAL

## 2024-07-08 VITALS
DIASTOLIC BLOOD PRESSURE: 86 MMHG | RESPIRATION RATE: 18 BRPM | OXYGEN SATURATION: 97 % | BODY MASS INDEX: 45 KG/M2 | HEART RATE: 80 BPM | SYSTOLIC BLOOD PRESSURE: 128 MMHG | TEMPERATURE: 97.8 F | WEIGHT: 280 LBS | HEIGHT: 66 IN

## 2024-07-08 DIAGNOSIS — F41.9 ANXIETY AND DEPRESSION: Primary | ICD-10-CM

## 2024-07-08 DIAGNOSIS — F32.A ANXIETY AND DEPRESSION: Primary | ICD-10-CM

## 2024-07-08 DIAGNOSIS — E66.01 CLASS 3 SEVERE OBESITY DUE TO EXCESS CALORIES WITH SERIOUS COMORBIDITY AND BODY MASS INDEX (BMI) OF 45.0 TO 49.9 IN ADULT (MULTI): ICD-10-CM

## 2024-07-08 PROCEDURE — 3008F BODY MASS INDEX DOCD: CPT | Performed by: PHYSICIAN ASSISTANT

## 2024-07-08 PROCEDURE — 99213 OFFICE O/P EST LOW 20 MIN: CPT | Performed by: PHYSICIAN ASSISTANT

## 2024-07-08 PROCEDURE — 3074F SYST BP LT 130 MM HG: CPT | Performed by: PHYSICIAN ASSISTANT

## 2024-07-08 PROCEDURE — 3079F DIAST BP 80-89 MM HG: CPT | Performed by: PHYSICIAN ASSISTANT

## 2024-07-08 ASSESSMENT — ENCOUNTER SYMPTOMS: ABDOMINAL PAIN: 0

## 2024-07-08 NOTE — PROGRESS NOTES
"Subjective   Patient ID: Phyllis Holbrook is a 54 y.o. female who presents for Follow-up (Pt here today for a follow up for anxiety, depression, weight management and med management. The Paxil pt stopped herself after about 3 weeks of being on it due to not liking the way it made her feel with the constipation and didn't want another fissure, like she had form a previous med. Then the Wegovy is going well; just took last dose of 0.25mg and starts 0.5mg on Sunday. ).    HPI     Anxiety and depression   - Tried Paxil, didn't like the way she was feeling on it so stopped   - Doesn't want to experiment with meds anymore   - Yesterday was a really bad day for her mom.   - Sees Jacy for counseling which she feels is helpful     Obesity   - Current med Wegovy 0.25 mg     Diet - has been doing a food diary   Exercise - walking during her lunch hour   Having an issue with ehr heel so made an appt with podiatrist. Walking is more painful     Review of Systems   Gastrointestinal:  Negative for abdominal pain.       Objective   /86   Pulse 80   Temp 36.6 °C (97.8 °F)   Resp 18   Ht 1.67 m (5' 5.75\")   Wt 127 kg (280 lb)   SpO2 97%   BMI 45.54 kg/m²     Physical Exam  Constitutional:       Appearance: Normal appearance. She is obese.   Neurological:      Mental Status: She is alert.   Psychiatric:         Mood and Affect: Mood normal.         Behavior: Behavior normal.         Thought Content: Thought content normal.         Judgment: Judgment normal.         Assessment/Plan     Problem List Items Addressed This Visit       Anxiety and depression - Primary    Overview     - Historical med: Wellbutrin (constipation), Paxil (constipation)   - Sees Jacy          Current Assessment & Plan     - Couldn't tolerate Paxil so stopped, not interested in meds right now   - Continue with counseling with Jacy and let us know if wanting to reconsider meds            Class 3 severe obesity due to excess calories with " serious comorbidity and body mass index (BMI) of 45.0 to 49.9 in adult (Multi)    Overview     - 4/5/24 - BMI = 47.39 kg/m2, Wt = 291 lbs. Started Wegovy (got it 4/21)   - 7/8/24 - BMI = 45.54 kg/ m2, Wt = 280 lbs. Down 11 lbs on Wegovy 0.25 mg. Now tapering up to Wegovy 0.5 mg          Current Assessment & Plan     - Current weight: 127 kg (280 lb)  - Weight change since last visit (-) denotes wt loss -11.4 lbs   - Weight loss needed to achieve BMI 25: 126.6 Lbs  - Weight loss needed to achieve BMI 30: 95.9 Lbs  - I congratulated the pt on her weight loss. She has been doing great with her lifestyle modifications and now down 11.4 lbs on subtherapeutic dose of Wegovy.   - Will now taper up Wegovy to 0.5 mg and continue with healthy lifestyle modifications  - Encouraged 3 month follow up for weight check

## 2024-07-08 NOTE — ASSESSMENT & PLAN NOTE
- Couldn't tolerate Paxil so stopped, not interested in meds right now   - Continue with counseling with Jacy and let us know if wanting to reconsider meds

## 2024-07-08 NOTE — ASSESSMENT & PLAN NOTE
- Current weight: 127 kg (280 lb)  - Weight change since last visit (-) denotes wt loss -11.4 lbs   - Weight loss needed to achieve BMI 25: 126.6 Lbs  - Weight loss needed to achieve BMI 30: 95.9 Lbs  - I congratulated the pt on her weight loss. She has been doing great with her lifestyle modifications and now down 11.4 lbs on subtherapeutic dose of Wegovy.   - Will now taper up Wegovy to 0.5 mg and continue with healthy lifestyle modifications  - Encouraged 3 month follow up for weight check

## 2024-07-25 ENCOUNTER — TELEPHONE (OUTPATIENT)
Dept: PRIMARY CARE | Facility: CLINIC | Age: 54
End: 2024-07-25
Payer: COMMERCIAL

## 2024-07-25 NOTE — PROGRESS NOTES
Patient needed to cancel last weeks appointment due to being called for jury duty. Connected with patient and r/s'd for a later date. Scheduled for 8/15 @ 12pm VV.

## 2024-07-29 ENCOUNTER — APPOINTMENT (OUTPATIENT)
Dept: PRIMARY CARE | Facility: CLINIC | Age: 54
End: 2024-07-29
Payer: COMMERCIAL

## 2024-08-15 ENCOUNTER — APPOINTMENT (OUTPATIENT)
Dept: PRIMARY CARE | Facility: CLINIC | Age: 54
End: 2024-08-15
Payer: COMMERCIAL

## 2024-08-16 NOTE — PROGRESS NOTES
Collaborative Care (CoCM)  Progress Note    Type of Interaction: Virtual    Start Time: 12:00pm    End Time: 12:47pm    Appointment: Not Scheduled    Reason for Visit:   Chief Complaint   Patient presents with    Depression    Anxiety     Interval History / Patient Symptoms:     Occ anxious     Interventions Provided: [place] Setting, Psychoeducation, Acceptance & Commitment Therapy, Interpersonal Therapy, Motivational Interviewing, Strengths Exploration, Grief Work, Develop Coping Strategies, Review Progress/Goals Stress Management, Mindfulness, and Treatment Planning    Progress Made: Significant    Response to Intervention: Patient shared today during session that she is feeling well. Reported that she did not serve jury duty, triggering case. Patient reported she continues to wish to maintain her mental health outside of medication. Reported she has been walking, spending time outside the home with friends, and attempting to manage her anger. Patient recalled most recent experiences getting upset with her mother, mom fell and has a hard time following directions. Patient is attempting to come from a place of understanding, believes she needs more help in the home. Patient's brothers are coming to visit soon and she plans to address her needs. Patient has lost 25lbs since Jan, pleased with her progress. Attempting to take less medication in future by tending to her physical health. Explored a anger mgmt plan when feeling heightened.     Plan: PHQ/ALEX, continue to support development of coping skills to manage irritability.     Patient Instructions   Patient is scheduled for 9/19 @ 12p VV    Follow Up / Next Appointment: Next appointment: 08/19/24

## 2024-08-19 DIAGNOSIS — J45.20 MILD INTERMITTENT ASTHMA WITHOUT COMPLICATION (HHS-HCC): ICD-10-CM

## 2024-08-19 DIAGNOSIS — J45.20 MILD INTERMITTENT ASTHMA WITHOUT COMPLICATION (HHS-HCC): Primary | ICD-10-CM

## 2024-08-19 RX ORDER — ALBUTEROL SULFATE 90 UG/1
2 INHALANT RESPIRATORY (INHALATION) EVERY 6 HOURS PRN
Qty: 54 G | Refills: 1 | Status: SHIPPED | OUTPATIENT
Start: 2024-08-19 | End: 2024-08-20

## 2024-08-20 RX ORDER — ALBUTEROL SULFATE 90 UG/1
2 INHALANT RESPIRATORY (INHALATION) EVERY 6 HOURS PRN
Qty: 18 G | Refills: 3 | Status: SHIPPED | OUTPATIENT
Start: 2024-08-20

## 2024-08-30 ENCOUNTER — DOCUMENTATION (OUTPATIENT)
Dept: PRIMARY CARE | Facility: CLINIC | Age: 54
End: 2024-08-30
Payer: COMMERCIAL

## 2024-08-30 DIAGNOSIS — F41.9 ANXIETY AND DEPRESSION: Primary | ICD-10-CM

## 2024-08-30 DIAGNOSIS — F32.A ANXIETY AND DEPRESSION: Primary | ICD-10-CM

## 2024-09-09 DIAGNOSIS — E66.9 OBESITY, UNSPECIFIED CLASSIFICATION, UNSPECIFIED OBESITY TYPE, UNSPECIFIED WHETHER SERIOUS COMORBIDITY PRESENT: Primary | ICD-10-CM

## 2024-09-09 RX ORDER — SEMAGLUTIDE 1 MG/.5ML
1 INJECTION, SOLUTION SUBCUTANEOUS
Qty: 2 ML | Refills: 0 | Status: SHIPPED | OUTPATIENT
Start: 2024-09-15 | End: 2024-10-07

## 2024-09-18 DIAGNOSIS — I10 ESSENTIAL HYPERTENSION: ICD-10-CM

## 2024-09-18 RX ORDER — LISINOPRIL 40 MG/1
40 TABLET ORAL DAILY
Qty: 90 TABLET | Refills: 0 | Status: SHIPPED | OUTPATIENT
Start: 2024-09-18

## 2024-09-18 RX ORDER — METOPROLOL SUCCINATE 200 MG/1
400 TABLET, EXTENDED RELEASE ORAL DAILY
Qty: 180 TABLET | Refills: 0 | Status: SHIPPED | OUTPATIENT
Start: 2024-09-18

## 2024-09-18 RX ORDER — AMLODIPINE BESYLATE 10 MG/1
10 TABLET ORAL DAILY
Qty: 90 TABLET | Refills: 0 | Status: SHIPPED | OUTPATIENT
Start: 2024-09-18

## 2024-09-19 ENCOUNTER — APPOINTMENT (OUTPATIENT)
Dept: PRIMARY CARE | Facility: CLINIC | Age: 54
End: 2024-09-19
Payer: COMMERCIAL

## 2024-09-19 NOTE — PROGRESS NOTES
Collaborative Care (CoCM)  Progress Note    Type of Interaction: Virtual    Start Time: 12:00pm    End Time: 12:48pm    Appointment: Not Scheduled    Reason for Visit:   Chief Complaint   Patient presents with    Depression    Anxiety     Interval History / Patient Symptoms:     Occ anxious, increased stressors     Interventions Provided: Psychoeducation, Behavioral Activation, Motivational Interviewing, Strengths Exploration, Develop Coping Strategies, Review Progress/Goals Stress Management, Mindfulness, and Treatment Planning    Progress Made: Moderate    Response to Intervention: Patient shared some ups and downs between sessions. Mom was approved for a hospital bed with rails which helps lower patient's anxiety about her getting out of bed. Patient reported a bed bug scare, processed her anxiety and supported patient with CBT techniques in challenging her thoughts. No pests have been seen in over a week. 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 Explored her son helping out more, patient processed barriers with this. Daughter is moving in with her in December. Wegovy increased due to plateau, increased heart burn, has appt with PCP next week. Writer encouraged nourishing meaningful relationships, moving body in a meaningful way, healthy balanced nutrition, a regular sleep schedule, and overall increased self-care to maintain mental health stability.      Plan: Check in with patient on progress maintaining assertive communication with son    Patient Instructions   VV 10/24 @ 12pm    Follow Up / Next Appointment: Next appointment: 10/24/24

## 2024-09-23 ASSESSMENT — ENCOUNTER SYMPTOMS
NECK PAIN: 0
PALPITATIONS: 0
SWEATS: 0
ORTHOPNEA: 0
PND: 0
SHORTNESS OF BREATH: 0
HYPERTENSION: 1
BLURRED VISION: 0
HEADACHES: 0

## 2024-09-24 ENCOUNTER — APPOINTMENT (OUTPATIENT)
Dept: PRIMARY CARE | Facility: CLINIC | Age: 54
End: 2024-09-24
Payer: COMMERCIAL

## 2024-09-24 VITALS
HEIGHT: 66 IN | RESPIRATION RATE: 16 BRPM | DIASTOLIC BLOOD PRESSURE: 62 MMHG | BODY MASS INDEX: 42.43 KG/M2 | HEART RATE: 92 BPM | OXYGEN SATURATION: 96 % | WEIGHT: 264 LBS | TEMPERATURE: 95.7 F | SYSTOLIC BLOOD PRESSURE: 102 MMHG

## 2024-09-24 DIAGNOSIS — E66.01 CLASS 3 SEVERE OBESITY DUE TO EXCESS CALORIES WITH SERIOUS COMORBIDITY AND BODY MASS INDEX (BMI) OF 40.0 TO 44.9 IN ADULT: Primary | ICD-10-CM

## 2024-09-24 DIAGNOSIS — K21.9 GASTROESOPHAGEAL REFLUX DISEASE WITHOUT ESOPHAGITIS: ICD-10-CM

## 2024-09-24 DIAGNOSIS — I10 ESSENTIAL HYPERTENSION: ICD-10-CM

## 2024-09-24 PROCEDURE — 3078F DIAST BP <80 MM HG: CPT | Performed by: PHYSICIAN ASSISTANT

## 2024-09-24 PROCEDURE — 99213 OFFICE O/P EST LOW 20 MIN: CPT | Performed by: PHYSICIAN ASSISTANT

## 2024-09-24 PROCEDURE — 1036F TOBACCO NON-USER: CPT | Performed by: PHYSICIAN ASSISTANT

## 2024-09-24 PROCEDURE — 3074F SYST BP LT 130 MM HG: CPT | Performed by: PHYSICIAN ASSISTANT

## 2024-09-24 PROCEDURE — 3008F BODY MASS INDEX DOCD: CPT | Performed by: PHYSICIAN ASSISTANT

## 2024-09-24 RX ORDER — OMEPRAZOLE 40 MG/1
40 CAPSULE, DELAYED RELEASE ORAL
Qty: 30 CAPSULE | Refills: 0 | Status: SHIPPED | OUTPATIENT
Start: 2024-09-24 | End: 2024-10-24

## 2024-09-24 RX ORDER — SEMAGLUTIDE 1 MG/.5ML
1 INJECTION, SOLUTION SUBCUTANEOUS
Qty: 6 ML | Refills: 1 | Status: SHIPPED | OUTPATIENT
Start: 2024-09-29

## 2024-09-24 ASSESSMENT — ENCOUNTER SYMPTOMS
SWEATS: 0
HEADACHES: 0
ORTHOPNEA: 0
NECK PAIN: 0
PND: 0
PALPITATIONS: 0
BLURRED VISION: 0
HYPERTENSION: 1
SHORTNESS OF BREATH: 0
HEARTBURN: 1
ABDOMINAL PAIN: 1

## 2024-09-24 NOTE — PROGRESS NOTES
"Subjective   Patient ID: Phyllis Holbrook is a 54 y.o. female who presents for 3 month follow up and Heartburn (Intense heart burn takes tums. ).    Hypertension  This is a recurrent problem. The current episode started more than 1 year ago. The problem is unchanged. The problem is controlled. Associated symptoms include anxiety. Pertinent negatives include no blurred vision, chest pain, headaches, malaise/fatigue, neck pain, orthopnea, palpitations, peripheral edema, PND, shortness of breath or sweats. There are no associated agents to hypertension. Risk factors for coronary artery disease include family history, obesity and stress. There are no compliance problems.         Follow up for obesity   - On Wegovy 1 mg q week  - Having bad hearburn, taking TUMS   - She did have heartburn before Wegovy but a lot worse now     HTN   - well controlled on current meds     Review of Systems   Constitutional:  Negative for malaise/fatigue.   Eyes:  Negative for blurred vision.   Respiratory:  Negative for shortness of breath.    Cardiovascular:  Negative for chest pain, palpitations, orthopnea and PND.   Gastrointestinal:  Positive for abdominal pain and heartburn.   Musculoskeletal:  Negative for neck pain.   Neurological:  Negative for headaches.       Objective   /62 (BP Location: Right arm, Patient Position: Sitting, BP Cuff Size: Large adult)   Pulse 92   Temp 35.4 °C (95.7 °F) (Temporal)   Resp 16   Ht 1.67 m (5' 5.75\")   Wt 120 kg (264 lb)   SpO2 96%   BMI 42.94 kg/m²     Physical Exam  Constitutional:       Appearance: Normal appearance.   Cardiovascular:      Rate and Rhythm: Normal rate and regular rhythm.      Pulses: Normal pulses.      Heart sounds: Normal heart sounds. No murmur heard.  Pulmonary:      Effort: Pulmonary effort is normal.      Breath sounds: Normal breath sounds.   Abdominal:      Tenderness: There is no abdominal tenderness.   Musculoskeletal:      Right lower leg: No edema.      " Left lower leg: No edema.   Neurological:      Mental Status: She is alert.   Psychiatric:         Mood and Affect: Mood and affect normal.         Assessment/Plan     Problem List Items Addressed This Visit       Essential hypertension    Overview     - Current meds: amlodipine 10 mg, lisinopril 40 mg, metoprolol  mg and spironolactone 100 mg          Current Assessment & Plan     - Stable and well controlled  - Continue current tx plan         Class 3 severe obesity due to excess calories with serious comorbidity and body mass index (BMI) of 45.0 to 49.9 in adult (Multi) - Primary    Overview     - 4/5/24 - BMI = 47.39 kg/m2, Wt = 291 lbs. Started Wegovy (got it 4/21)   - 7/8/24 - BMI = 45.54 kg/ m2, Wt = 280 lbs. Down 11 lbs on Wegovy 0.25 mg. Now tapering up to Wegovy 0.5 mg   - 9/24/24 - BMI = 42.94 kg/m2, Wt = 264 lbs. Down 16 lbs more on Wegovy 1 mg         Current Assessment & Plan     - Current weight: 120 kg (264 lb)  - Weight change since last visit (-) denotes wt loss: -16 lbs   - Weight loss needed to achieve BMI 25: 110.6 Lbs  - Weight loss needed to achieve BMI 30: 79.9 Lbs  - Down 27lbs total! Congratulated the pt on her weight loss and encouraged continued healthy lifestyle   - She is having some side effects (heartburn) on Wegovy so will stay at 1 mg for now without tapering up          Relevant Medications    semaglutide, weight loss, (Wegovy) 1 mg/0.5 mL pen injector (Start on 9/29/2024)     Other Visit Diagnoses       Gastroesophageal reflux disease without esophagitis        Relevant Medications    omeprazole (PriLOSEC) 40 mg DR capsule

## 2024-09-24 NOTE — ASSESSMENT & PLAN NOTE
- Current weight: 120 kg (264 lb)  - Weight change since last visit (-) denotes wt loss: -16 lbs   - Weight loss needed to achieve BMI 25: 110.6 Lbs  - Weight loss needed to achieve BMI 30: 79.9 Lbs  - Down 27lbs total! Congratulated the pt on her weight loss and encouraged continued healthy lifestyle   - She is having some side effects (heartburn) on Wegovy so will stay at 1 mg for now without tapering up

## 2024-09-26 ENCOUNTER — DOCUMENTATION (OUTPATIENT)
Dept: PRIMARY CARE | Facility: CLINIC | Age: 54
End: 2024-09-26
Payer: COMMERCIAL

## 2024-09-26 DIAGNOSIS — F41.9 ANXIETY AND DEPRESSION: Primary | ICD-10-CM

## 2024-09-26 DIAGNOSIS — F32.A ANXIETY AND DEPRESSION: Primary | ICD-10-CM

## 2024-09-26 PROCEDURE — 99493 SBSQ PSYC COLLAB CARE MGMT: CPT | Performed by: PHYSICIAN ASSISTANT

## 2024-10-02 DIAGNOSIS — I10 ESSENTIAL HYPERTENSION: ICD-10-CM

## 2024-10-02 RX ORDER — SPIRONOLACTONE 100 MG/1
100 TABLET, FILM COATED ORAL DAILY
Qty: 90 TABLET | Refills: 1 | Status: SHIPPED | OUTPATIENT
Start: 2024-10-02

## 2024-10-21 DIAGNOSIS — K21.9 GASTROESOPHAGEAL REFLUX DISEASE WITHOUT ESOPHAGITIS: ICD-10-CM

## 2024-10-21 RX ORDER — OMEPRAZOLE 40 MG/1
CAPSULE, DELAYED RELEASE ORAL
Qty: 30 CAPSULE | Refills: 0 | Status: SHIPPED | OUTPATIENT
Start: 2024-10-21

## 2024-10-23 ENCOUNTER — TELEPHONE (OUTPATIENT)
Dept: PRIMARY CARE | Facility: CLINIC | Age: 54
End: 2024-10-23
Payer: COMMERCIAL

## 2024-10-24 ENCOUNTER — APPOINTMENT (OUTPATIENT)
Dept: PRIMARY CARE | Facility: CLINIC | Age: 54
End: 2024-10-24
Payer: COMMERCIAL

## 2024-11-15 ENCOUNTER — APPOINTMENT (OUTPATIENT)
Dept: PRIMARY CARE | Facility: CLINIC | Age: 54
End: 2024-11-15
Payer: COMMERCIAL

## 2024-11-15 NOTE — PROGRESS NOTES
Patient needed to r/s today, mother in the hospital. To be discharged soon.     R/s for 11/27 @ 12pm VV

## 2024-11-27 ENCOUNTER — APPOINTMENT (OUTPATIENT)
Dept: PRIMARY CARE | Facility: CLINIC | Age: 54
End: 2024-11-27
Payer: COMMERCIAL

## 2024-11-27 NOTE — PROGRESS NOTES
Collaborative Care (CoCM)  Progress Note    Type of Interaction: Virtual    Start Time: 12:00pm    End Time: 12:37pm    Appointment: Not Scheduled    Reason for Visit:   Chief Complaint   Patient presents with    Depression    Anxiety     Interval History / Patient Symptoms:     Occ sub opt, situational stressors     Interventions Provided: Psychoeducation, Acceptance & Commitment Therapy, Motivational Interviewing, Strengths Exploration, Develop Coping Strategies, Review Progress/Goals Stress Management, Mindfulness, and Treatment Planning    Progress Made: Moderate    Response to Intervention: Patient shared that her mother is back in the hospital with a UTI for the third time, looking at rehab placements. Patient shared she is coming to the realization that she cannot lift her. Over estimated her abilities to meet her needs. Mother needs to be able to walk on her own again in order to come back home. Patient shared that she is going back and forth to the hospital, working, and feeling exhausted when she returns home. Is unable to increase movement due to limitations in schedule along with feeling tired. Open to exploring ways to increase movement at home (hand weights). Writer encouraged nourishing meaningful relationships, moving body in a meaningful way, healthy balanced nutrition, a regular sleep schedule, and overall increased self-care to maintain mental health stability.      Plan: Patient shared that she will reach out in the event that she feels a mental health medication would help with navigating her stress. Using mindfulness.     Patient Instructions   01/10/2025 @ 12pm VV     Follow Up / Next Appointment: Next appointment: 01/10/25

## 2024-11-30 PROCEDURE — 99493 SBSQ PSYC COLLAB CARE MGMT: CPT | Performed by: FAMILY MEDICINE

## 2024-12-02 ENCOUNTER — DOCUMENTATION (OUTPATIENT)
Dept: PRIMARY CARE | Facility: CLINIC | Age: 54
End: 2024-12-02
Payer: COMMERCIAL

## 2024-12-02 DIAGNOSIS — F32.A ANXIETY AND DEPRESSION: Primary | ICD-10-CM

## 2024-12-02 DIAGNOSIS — F41.9 ANXIETY AND DEPRESSION: Primary | ICD-10-CM

## 2024-12-13 ENCOUNTER — APPOINTMENT (OUTPATIENT)
Dept: RADIOLOGY | Facility: HOSPITAL | Age: 54
End: 2024-12-13
Payer: COMMERCIAL

## 2024-12-13 DIAGNOSIS — Z12.31 SCREENING MAMMOGRAM FOR BREAST CANCER: ICD-10-CM

## 2024-12-17 ENCOUNTER — APPOINTMENT (OUTPATIENT)
Dept: PRIMARY CARE | Facility: CLINIC | Age: 54
End: 2024-12-17
Payer: COMMERCIAL

## 2024-12-17 ENCOUNTER — HOSPITAL ENCOUNTER (OUTPATIENT)
Dept: RADIOLOGY | Facility: HOSPITAL | Age: 54
Discharge: HOME | End: 2024-12-17
Payer: COMMERCIAL

## 2024-12-17 VITALS — BODY MASS INDEX: 40.33 KG/M2 | WEIGHT: 248 LBS

## 2024-12-17 VITALS
OXYGEN SATURATION: 97 % | TEMPERATURE: 97.2 F | SYSTOLIC BLOOD PRESSURE: 126 MMHG | HEART RATE: 88 BPM | DIASTOLIC BLOOD PRESSURE: 84 MMHG | BODY MASS INDEX: 40.18 KG/M2 | HEIGHT: 66 IN | WEIGHT: 250 LBS

## 2024-12-17 DIAGNOSIS — Z12.31 SCREENING MAMMOGRAM FOR BREAST CANCER: ICD-10-CM

## 2024-12-17 DIAGNOSIS — I10 ESSENTIAL HYPERTENSION: ICD-10-CM

## 2024-12-17 DIAGNOSIS — K60.2 ANAL FISSURE: ICD-10-CM

## 2024-12-17 DIAGNOSIS — L98.9 SKIN LESION: ICD-10-CM

## 2024-12-17 DIAGNOSIS — E55.9 VITAMIN D DEFICIENCY: ICD-10-CM

## 2024-12-17 DIAGNOSIS — I10 ESSENTIAL HYPERTENSION: Primary | ICD-10-CM

## 2024-12-17 DIAGNOSIS — K21.9 GASTROESOPHAGEAL REFLUX DISEASE WITHOUT ESOPHAGITIS: ICD-10-CM

## 2024-12-17 DIAGNOSIS — Z23 NEED FOR COVID-19 VACCINE: ICD-10-CM

## 2024-12-17 PROCEDURE — 99214 OFFICE O/P EST MOD 30 MIN: CPT | Performed by: PHYSICIAN ASSISTANT

## 2024-12-17 PROCEDURE — 1036F TOBACCO NON-USER: CPT | Performed by: PHYSICIAN ASSISTANT

## 2024-12-17 PROCEDURE — 3008F BODY MASS INDEX DOCD: CPT | Performed by: PHYSICIAN ASSISTANT

## 2024-12-17 PROCEDURE — 90480 ADMN SARSCOV2 VAC 1/ONLY CMP: CPT | Performed by: PHYSICIAN ASSISTANT

## 2024-12-17 PROCEDURE — 91322 SARSCOV2 VAC 50 MCG/0.5ML IM: CPT | Performed by: PHYSICIAN ASSISTANT

## 2024-12-17 PROCEDURE — 77063 BREAST TOMOSYNTHESIS BI: CPT

## 2024-12-17 PROCEDURE — 3074F SYST BP LT 130 MM HG: CPT | Performed by: PHYSICIAN ASSISTANT

## 2024-12-17 PROCEDURE — 3079F DIAST BP 80-89 MM HG: CPT | Performed by: PHYSICIAN ASSISTANT

## 2024-12-17 RX ORDER — AMLODIPINE BESYLATE 10 MG/1
10 TABLET ORAL DAILY
Qty: 90 TABLET | Refills: 0 | Status: SHIPPED | OUTPATIENT
Start: 2024-12-17

## 2024-12-17 RX ORDER — METOPROLOL SUCCINATE 200 MG/1
400 TABLET, EXTENDED RELEASE ORAL DAILY
Qty: 180 TABLET | Refills: 0 | Status: SHIPPED | OUTPATIENT
Start: 2024-12-17

## 2024-12-17 RX ORDER — OMEPRAZOLE 40 MG/1
40 CAPSULE, DELAYED RELEASE ORAL
Qty: 90 CAPSULE | Refills: 0 | Status: SHIPPED | OUTPATIENT
Start: 2024-12-17

## 2024-12-17 RX ORDER — LISINOPRIL 40 MG/1
40 TABLET ORAL DAILY
Qty: 90 TABLET | Refills: 0 | Status: SHIPPED | OUTPATIENT
Start: 2024-12-17

## 2024-12-17 NOTE — PROGRESS NOTES
Subjective   Patient ID: Phyllis Holbrook is a 54 y.o. female who presents for Follow-up.    RONY Cain pt here today for a 3 month follow up for Wegovy and Dr Cotton just sent BP meds to [facility]. Needing Omeprazole refilled.    Wegovy seems to be working but since the last visit seems like dropping weight is losing down. It has been making her stomach a little queasy off/on, randomly. Pt has lost 14# IN THE PAST 3 MO.   If she eats too much it can make her queasy. She denies needing Zofran. BM's are occ constipated. She takes Miralax when needed (about 1-2 x a week).     GERD- PPI working well, no breakthrough heartburn and no blood in stool.  She needs a refill.    Pt gets constipated off/on and takes OTC powder for it but previous Gi doctor gave her a compound ointment for anal fisstula and she wants to know if you can fill it. Nifedipine 0.2%. It is still working well PRN.    Last labs 1/5/24  Pt requested her Covid vaccine today  Patient is interested in receiving the Covid vaccination and was told that:    1.  The vaccine is experimental:  As a patient you are taking part in a trial and still can contract the disease.    2.  No guarantee of immunity: The vaccine could only lessen symptoms.    3.  You can still spread the disease: You will not be exempt from precautions mandated by the government used to help mitigate the spread of disease.    4.  Vaccine damage and death: Doctors and  do not know what short and long-term damage in the vaccine can cause.  As no long-term studies have taken place, this can include permanent disability and death.    I have advised the patient to do their own research in deciding whether or not this vaccine is best for them.  I cannot give an endorsement.        Review of Systems  Constitutional: Patient denies any fever, chills, loss of appetite, or unexplained weight loss.  Cardiovascular: Patient denies any chest pain, shortness of breath with exertion,  "tachycardia, palpitations, orthopnea, or paroxysmal nocturnal dyspnea.  Respiratory: Patient denies any cough, shortness breath, or wheezing.  Gastrointestinal patient denies any nausea, vomiting, diarrhea, constipation, melena, hematochezia, or reflux symptoms  Skin: skin lesion tip of nose, present over 6 mo not healing  Neurology: Patient denies any new motor or sensory losses.  Denies any numbness, tingling, weakness, and incoordination of the extremities.  Patient also denies any tremor, seizures, or gait instability.  Endocrinology: Denies any polyuria, polydipsia, polyphagia, or heat/cold intolerance.    Objective   /84   Pulse 88   Temp 36.2 °C (97.2 °F)   Ht 1.67 m (5' 5.75\")   Wt 113 kg (250 lb)   SpO2 97%   BMI 40.66 kg/m²     Physical Exam  Gen. appearance: Alert and cooperative, no acute distress, developed, well-nourished obese female.  Neck: Supple and without adenopathy or rigidity.  There is no JVD at 90° and no carotid bruits are noted.  Cardiovascular: Heart has a regular rate and rhythm without murmur or ectopy.  Respiratory: Lungs are clear to auscultation bilaterally with good air exchange.  Skin-tip of the nose with small AK      Assessment/Plan   Diagnoses and all orders for this visit:  Essential hypertension  -     CBC and Auto Differential; Future  -     Comprehensive Metabolic Panel; Future  -     Lipid Panel; Future  -     TSH with reflex to Free T4 if abnormal; Future  -     Hemoglobin A1C; Future  -     Follow Up In [facility] - PCP - Established; Future  Metoprolol and amlodipine are controlling her blood pressure well.  She will continue current dosing.  Blood pressure today was 126/84.  She denies side effects from use and will follow-up in 6 months.    Gastroesophageal reflux disease without esophagitis  -     omeprazole (PriLOSEC) 40 mg DR capsule; Take 1 capsule (40 mg) by mouth once daily in the morning. Take before meals. Do not crush or chew.  Sx stable, " patient will continue with PPI use and diet modifications to decrease symptoms of GERD.    Anal fissure  -     nifedipine 0.2% ointment - Compounded - Outpatient; Apply a pea sized amount to anus twice daily  Symptoms are well-managed with nifedipine cream.  Prescription was sent.    Skin lesion  -     Referral to Dermatology  AK  noted at tip of the nose.  X 6 months patient is being referred to dermatology for further evaluation and treatment    Vitamin D deficiency  -     Vitamin D 1,25 Dihydroxy (for eval of hypercalcemia); Future  Pt is currently not on supplementation    Need for COVID-19 vaccine  -     Moderna COVID-19 vaccine, monovalent, age 12 years and older, (50mcg/0.5mL)(Spikevax)    Pt is to return to the clinic in 3 months or sooner as needed.  Refills were provided today.  She will have labs done prior to her appointment.    Patient understands that should they have testing outside   facilities that we may not receive the results and was told to call us if they have not heard from our office within a week after testing.    [facility] uses voice recognition technology for dictations. Sometimes the software misinterprets words. Please take this into account when reading this.

## 2024-12-24 ENCOUNTER — HOSPITAL ENCOUNTER (OUTPATIENT)
Dept: RADIOLOGY | Facility: EXTERNAL LOCATION | Age: 54
Discharge: HOME | End: 2024-12-24

## 2025-01-03 DIAGNOSIS — I10 ESSENTIAL HYPERTENSION: ICD-10-CM

## 2025-01-03 RX ORDER — SPIRONOLACTONE 100 MG/1
100 TABLET, FILM COATED ORAL DAILY
Qty: 90 TABLET | Refills: 1 | Status: SHIPPED | OUTPATIENT
Start: 2025-01-03

## 2025-01-10 ENCOUNTER — APPOINTMENT (OUTPATIENT)
Dept: PRIMARY CARE | Facility: CLINIC | Age: 55
End: 2025-01-10
Payer: COMMERCIAL

## 2025-01-10 NOTE — PROGRESS NOTES
Collaborative Care (CoCM)  Progress Note    Type of Interaction: Virtual    Start Time: 12:05pm    End Time: 12:45pm    Appointment: Not Scheduled    Reason for Visit:   Chief Complaint   Patient presents with    Depression    Anxiety     Interval History / Patient Symptoms:     Stable     Interventions Provided: [place] Setting, Psychoeducation, Motivational Interviewing, Strengths Exploration, Values Exploration, Develop Coping Strategies, Review Progress/Goals Stress Management, Mindfulness, Treatment Planning, and Self Care     Progress Made: Significant    Response to Intervention: Patient shared that her mother is home now. Processed stressors associated with the lack of appropriate care for her mother while in the nursing home. Processed through her daughter and daughter's partner residing with her now. Chaotic house. Patient feels as if her mother being at home is the best place for her now. Aware that this may not be how things always will be. Feels less anxious with her home. Encouraged down time and self care to avoid care giver burn out. Writer encouraged nourishing meaningful relationships, moving body in a meaningful way, healthy balanced nutrition, a regular sleep schedule, and overall increased self-care to maintain mental health stability.      Plan: Patient shared that she will reach out in the event that she feels a mental health medication would help with navigating her stress. Using mindfulness. Encouraged self care     Patient Instructions   3/18 @ 9:30am    Follow Up / Next Appointment: Next appointment: 03/18/25

## 2025-01-21 ENCOUNTER — TELEPHONE (OUTPATIENT)
Dept: PHARMACY | Facility: HOSPITAL | Age: 55
End: 2025-01-21
Payer: COMMERCIAL

## 2025-01-21 NOTE — TELEPHONE ENCOUNTER
Prior Authorization Approval    Prior authorization approved for Wegovy 1 mg until 1/21/2026.    LVM today to notify. Patient is to call pharmacy to notify of prior authorization approval. If additional assistance is needed, advised patient to call [facility] at [phone].    Please reach out to clinical pharmacy team with any questions/concerns.    Thank you,  Candace Barnard, PharmD

## 2025-01-27 ENCOUNTER — DOCUMENTATION (OUTPATIENT)
Dept: PRIMARY CARE | Facility: CLINIC | Age: 55
End: 2025-01-27
Payer: COMMERCIAL

## 2025-01-27 DIAGNOSIS — F41.9 ANXIETY AND DEPRESSION: Primary | ICD-10-CM

## 2025-01-27 DIAGNOSIS — F32.A ANXIETY AND DEPRESSION: Primary | ICD-10-CM

## 2025-01-31 PROCEDURE — 99493 SBSQ PSYC COLLAB CARE MGMT: CPT | Performed by: FAMILY MEDICINE

## 2025-03-15 LAB
1,25(OH)2D SERPL-MCNC: NORMAL PG/ML
1,25(OH)2D2 SERPL-MCNC: NORMAL PG/ML
1,25(OH)2D3 SERPL-MCNC: NORMAL PG/ML
ALBUMIN SERPL-MCNC: 4.4 G/DL (ref 3.6–5.1)
ALP SERPL-CCNC: 63 U/L (ref 37–153)
ALT SERPL-CCNC: 16 U/L (ref 6–29)
ANION GAP SERPL CALCULATED.4IONS-SCNC: 9 MMOL/L (CALC) (ref 7–17)
AST SERPL-CCNC: 22 U/L (ref 10–35)
BASOPHILS # BLD AUTO: 47 CELLS/UL (ref 0–200)
BASOPHILS NFR BLD AUTO: 0.6 %
BILIRUB SERPL-MCNC: 0.4 MG/DL (ref 0.2–1.2)
BUN SERPL-MCNC: 21 MG/DL (ref 7–25)
CALCIUM SERPL-MCNC: 9.8 MG/DL (ref 8.6–10.4)
CHLORIDE SERPL-SCNC: 102 MMOL/L (ref 98–110)
CHOLEST SERPL-MCNC: 204 MG/DL
CHOLEST/HDLC SERPL: 4.4 (CALC)
CO2 SERPL-SCNC: 26 MMOL/L (ref 20–32)
CREAT SERPL-MCNC: 1.1 MG/DL (ref 0.5–1.03)
EGFRCR SERPLBLD CKD-EPI 2021: 59 ML/MIN/1.73M2
EOSINOPHIL # BLD AUTO: 292 CELLS/UL (ref 15–500)
EOSINOPHIL NFR BLD AUTO: 3.7 %
ERYTHROCYTE [DISTWIDTH] IN BLOOD BY AUTOMATED COUNT: 11.4 % (ref 11–15)
EST. AVERAGE GLUCOSE BLD GHB EST-MCNC: 100 MG/DL
EST. AVERAGE GLUCOSE BLD GHB EST-SCNC: 5.5 MMOL/L
GLUCOSE SERPL-MCNC: 102 MG/DL (ref 65–99)
HBA1C MFR BLD: 5.1 % OF TOTAL HGB
HCT VFR BLD AUTO: 38.8 % (ref 35–45)
HDLC SERPL-MCNC: 46 MG/DL
HGB BLD-MCNC: 12.8 G/DL (ref 11.7–15.5)
LDLC SERPL CALC-MCNC: 132 MG/DL (CALC)
LYMPHOCYTES # BLD AUTO: 1991 CELLS/UL (ref 850–3900)
LYMPHOCYTES NFR BLD AUTO: 25.2 %
MCH RBC QN AUTO: 32.3 PG (ref 27–33)
MCHC RBC AUTO-ENTMCNC: 33 G/DL (ref 32–36)
MCV RBC AUTO: 98 FL (ref 80–100)
MONOCYTES # BLD AUTO: 450 CELLS/UL (ref 200–950)
MONOCYTES NFR BLD AUTO: 5.7 %
NEUTROPHILS # BLD AUTO: 5119 CELLS/UL (ref 1500–7800)
NEUTROPHILS NFR BLD AUTO: 64.8 %
NONHDLC SERPL-MCNC: 158 MG/DL (CALC)
PLATELET # BLD AUTO: 255 THOUSAND/UL (ref 140–400)
PMV BLD REES-ECKER: 10 FL (ref 7.5–12.5)
POTASSIUM SERPL-SCNC: 4.7 MMOL/L (ref 3.5–5.3)
PROT SERPL-MCNC: 7 G/DL (ref 6.1–8.1)
RBC # BLD AUTO: 3.96 MILLION/UL (ref 3.8–5.1)
SODIUM SERPL-SCNC: 137 MMOL/L (ref 135–146)
TRIGL SERPL-MCNC: 149 MG/DL
TSH SERPL-ACNC: 0.58 MIU/L (ref 0.4–4.5)
WBC # BLD AUTO: 7.9 THOUSAND/UL (ref 3.8–10.8)

## 2025-03-17 DIAGNOSIS — I10 ESSENTIAL HYPERTENSION: ICD-10-CM

## 2025-03-17 RX ORDER — LISINOPRIL 40 MG/1
40 TABLET ORAL DAILY
Qty: 90 TABLET | Refills: 0 | Status: SHIPPED | OUTPATIENT
Start: 2025-03-17

## 2025-03-17 RX ORDER — AMLODIPINE BESYLATE 10 MG/1
10 TABLET ORAL DAILY
Qty: 90 TABLET | Refills: 0 | Status: SHIPPED | OUTPATIENT
Start: 2025-03-17

## 2025-03-17 RX ORDER — METOPROLOL SUCCINATE 200 MG/1
400 TABLET, EXTENDED RELEASE ORAL DAILY
Qty: 180 TABLET | Refills: 0 | Status: SHIPPED | OUTPATIENT
Start: 2025-03-17 | End: 2025-03-18 | Stop reason: ALTCHOICE

## 2025-03-18 ENCOUNTER — SOCIAL WORK (OUTPATIENT)
Dept: PRIMARY CARE | Facility: CLINIC | Age: 55
End: 2025-03-18
Payer: COMMERCIAL

## 2025-03-18 ENCOUNTER — APPOINTMENT (OUTPATIENT)
Dept: PRIMARY CARE | Facility: CLINIC | Age: 55
End: 2025-03-18
Payer: COMMERCIAL

## 2025-03-18 VITALS
WEIGHT: 238 LBS | BODY MASS INDEX: 38.25 KG/M2 | HEIGHT: 66 IN | DIASTOLIC BLOOD PRESSURE: 70 MMHG | SYSTOLIC BLOOD PRESSURE: 100 MMHG | OXYGEN SATURATION: 98 % | HEART RATE: 85 BPM | TEMPERATURE: 97.1 F | RESPIRATION RATE: 18 BRPM

## 2025-03-18 DIAGNOSIS — K21.9 GASTROESOPHAGEAL REFLUX DISEASE WITHOUT ESOPHAGITIS: ICD-10-CM

## 2025-03-18 DIAGNOSIS — I10 ESSENTIAL HYPERTENSION: ICD-10-CM

## 2025-03-18 DIAGNOSIS — N28.9 RENAL INSUFFICIENCY: ICD-10-CM

## 2025-03-18 DIAGNOSIS — E66.812 CLASS 2 SEVERE OBESITY DUE TO EXCESS CALORIES WITH SERIOUS COMORBIDITY AND BODY MASS INDEX (BMI) OF 38.0 TO 38.9 IN ADULT: Primary | ICD-10-CM

## 2025-03-18 DIAGNOSIS — E66.01 CLASS 2 SEVERE OBESITY DUE TO EXCESS CALORIES WITH SERIOUS COMORBIDITY AND BODY MASS INDEX (BMI) OF 38.0 TO 38.9 IN ADULT: Primary | ICD-10-CM

## 2025-03-18 PROCEDURE — 3078F DIAST BP <80 MM HG: CPT | Performed by: PHYSICIAN ASSISTANT

## 2025-03-18 PROCEDURE — 3008F BODY MASS INDEX DOCD: CPT | Performed by: PHYSICIAN ASSISTANT

## 2025-03-18 PROCEDURE — 99214 OFFICE O/P EST MOD 30 MIN: CPT | Performed by: PHYSICIAN ASSISTANT

## 2025-03-18 PROCEDURE — 3074F SYST BP LT 130 MM HG: CPT | Performed by: PHYSICIAN ASSISTANT

## 2025-03-18 PROCEDURE — 1036F TOBACCO NON-USER: CPT | Performed by: PHYSICIAN ASSISTANT

## 2025-03-18 RX ORDER — SEMAGLUTIDE 1.7 MG/.75ML
1.7 INJECTION, SOLUTION SUBCUTANEOUS WEEKLY
Qty: 3 ML | Refills: 3 | Status: SHIPPED | OUTPATIENT
Start: 2025-03-18 | End: 2025-07-02

## 2025-03-18 RX ORDER — OMEPRAZOLE 40 MG/1
40 CAPSULE, DELAYED RELEASE ORAL
Qty: 90 CAPSULE | Refills: 1 | Status: SHIPPED | OUTPATIENT
Start: 2025-03-18 | End: 2025-03-19

## 2025-03-18 ASSESSMENT — PATIENT HEALTH QUESTIONNAIRE - PHQ9
3. TROUBLE FALLING OR STAYING ASLEEP: NOT AT ALL
SUM OF ALL RESPONSES TO PHQ QUESTIONS 1-9: 0
10. IF YOU CHECKED OFF ANY PROBLEMS, HOW DIFFICULT HAVE THESE PROBLEMS MADE IT FOR YOU TO DO YOUR WORK, TAKE CARE OF THINGS AT HOME, OR GET ALONG WITH OTHER PEOPLE: NOT DIFFICULT AT ALL
9. THOUGHTS THAT YOU WOULD BE BETTER OFF DEAD, OR OF HURTING YOURSELF: NOT AT ALL
2. FEELING DOWN, DEPRESSED OR HOPELESS: NOT AT ALL
5. POOR APPETITE OR OVEREATING: NOT AT ALL
SUM OF ALL RESPONSES TO PHQ9 QUESTIONS 1 & 2: 0
8. MOVING OR SPEAKING SO SLOWLY THAT OTHER PEOPLE COULD HAVE NOTICED. OR THE OPPOSITE, BEING SO FIGETY OR RESTLESS THAT YOU HAVE BEEN MOVING AROUND A LOT MORE THAN USUAL: NOT AT ALL
7. TROUBLE CONCENTRATING ON THINGS, SUCH AS READING THE NEWSPAPER OR WATCHING TELEVISION: NOT AT ALL
1. LITTLE INTEREST OR PLEASURE IN DOING THINGS: NOT AT ALL
6. FEELING BAD ABOUT YOURSELF - OR THAT YOU ARE A FAILURE OR HAVE LET YOURSELF OR YOUR FAMILY DOWN: NOT AT ALL
4. FEELING TIRED OR HAVING LITTLE ENERGY: NOT AT ALL

## 2025-03-18 ASSESSMENT — ENCOUNTER SYMPTOMS
CONSTITUTIONAL NEGATIVE: 1
CARDIOVASCULAR NEGATIVE: 1
RESPIRATORY NEGATIVE: 1
DIZZINESS: 1
MUSCULOSKELETAL NEGATIVE: 1
PSYCHIATRIC NEGATIVE: 1
GASTROINTESTINAL NEGATIVE: 1

## 2025-03-18 ASSESSMENT — ANXIETY QUESTIONNAIRES
IF YOU CHECKED OFF ANY PROBLEMS ON THIS QUESTIONNAIRE, HOW DIFFICULT HAVE THESE PROBLEMS MADE IT FOR YOU TO DO YOUR WORK, TAKE CARE OF THINGS AT HOME, OR GET ALONG WITH OTHER PEOPLE: NOT DIFFICULT AT ALL
5. BEING SO RESTLESS THAT IT IS HARD TO SIT STILL: NOT AT ALL
6. BECOMING EASILY ANNOYED OR IRRITABLE: NOT AT ALL
GAD7 TOTAL SCORE: 2
3. WORRYING TOO MUCH ABOUT DIFFERENT THINGS: SEVERAL DAYS
1. FEELING NERVOUS, ANXIOUS, OR ON EDGE: NOT AT ALL
4. TROUBLE RELAXING: NOT AT ALL
2. NOT BEING ABLE TO STOP OR CONTROL WORRYING: SEVERAL DAYS
7. FEELING AFRAID AS IF SOMETHING AWFUL MIGHT HAPPEN: NOT AT ALL

## 2025-03-18 NOTE — PROGRESS NOTES
Collaborative Care (CoCM)  Progress Note    Type of Interaction: In Office    Start Time: 9:35am    End Time: 10:20am    Appointment: Not Scheduled    Reason for Visit:   Chief Complaint   Patient presents with    Depression    Anxiety     Interval History / Patient Symptoms:     Patient Health Questionnaire-9 Score: 0 (3/18/2025 10:21 AM)  ALEX-7 Total Score: 2 (3/18/2025 10:21 AM)    Interventions Provided: Psychoeducation, Acceptance & Commitment Therapy, Motivational Interviewing, Strengths Exploration, Values Exploration, Develop Coping Strategies, Review Progress/Goals Stress Management, Mindfulness, and Treatment Planning    Progress Made: Moderate    Response to Intervention: Patient reported that she hired a nighttime helper to support her getting her mother into bed. Processed through her continued decline and what the future might look like. Patient reported that she does not feel comfortable leaving her alone to walk. Processed through her worries related to her son's life and supported patient in challenging her thoughts. Continues to work toward losing weight, increasing her wegovy. Challenged patient to engage in activities that she enjoys after her mother goes to sleep, enjoys yardwork and gardening. Encouraged self-care when possible.     Plan: ACT/BA--SELF CARE     Patient Instructions   4/28 @ 12pm VV    Follow Up / Next Appointment: Next appointment: 04/28/25

## 2025-03-18 NOTE — ASSESSMENT & PLAN NOTE
- Has lost 53lbs since last year and BP trending down. Now experiencing some orthostatic hypotension  - Recommend she d/c the spironolactone and monitor BP at home. If still normal or low, taper off the metoprolol

## 2025-03-18 NOTE — ASSESSMENT & PLAN NOTE
- Current weight: 108 kg (238 lb)  - Weight change since last visit (-) denotes wt loss -12 lbs   - Weight loss needed to achieve BMI 25: 84.6 Lbs  - Weight loss needed to achieve BMI 30: 53.9 Lbs  - Congratulated the pt on her healthy dietary and lifestyle changes and encouraged her to continue  - Try to reduce saturated fats in the diet though as her cholesterol has worsened   - She will be talking with nutritionist soon through [facility] [employer] [facility] [employer]  - Will taper up Wegovy to 1.7 mg qwk

## 2025-03-18 NOTE — ASSESSMENT & PLAN NOTE
- New finding   - Check UACr - ordered   - May be due to diuretics and poor hydration   - Will stop the spironolactone as BP has improved with weight loss   - Encouraged she drink more water   - Her mom had renal cell carcinoma so low index for renal imaging

## 2025-03-18 NOTE — PROGRESS NOTES
"Subjective   Patient ID: Phyllis Holbrook is a 54 y.o. female who presents for Follow-up (Pt here today for a follow up for weight management; Wegovy has slowed down working past several months, so wants to increase it.//Also discuss recent lab work. ).    HPI     Obese:   - feels as though her weight loss has slowed down  -goes up 2 pounds then down 2 and can't seem to get out of that cycle   -wants to go up in dosage on the Wegovy   -diet: been a struggle, eats a lot of carbs/ more than she should, does not like fish, is not an adventurous eater and does not like to try new things. Is struggling to get rid of carbs: rice, pasta, potatoes. Does chicken, beef, and pork. Does do a lot of veggies, does a salad daily.   -exercise: walking now, bought an kinkonal, working her way up to 10,000 steps a day, not there yet. Now around 7,000 steps a day   - has appt through [employer] [facility] for nutrition consult, required by her insurance     Renal insufficiency:   - has been on amlodipine, lisinopril, metoprolol, and spironolactone for 10 plus years from a previous provider     Orthostatic hypotension:  -gets dizzy upon standing, has to take a minute before standing   -on 4 anti HTN meds, been on them for years  -her weight has significantly dropped from when she was started on all of these meds     Review of Systems   Constitutional: Negative.    HENT: Negative.     Respiratory: Negative.     Cardiovascular: Negative.    Gastrointestinal: Negative.    Genitourinary: Negative.    Musculoskeletal: Negative.    Skin: Negative.    Neurological:  Positive for dizziness.        Upon standing, not every time    Psychiatric/Behavioral: Negative.         Objective   /70   Pulse 85   Temp 36.2 °C (97.1 °F)   Resp 18   Ht 1.67 m (5' 5.75\")   Wt 108 kg (238 lb)   SpO2 98%   BMI 38.71 kg/m²     Physical Exam  Constitutional:       Appearance: Normal appearance. She is obese.   HENT:      Head: " Normocephalic and atraumatic.   Cardiovascular:      Rate and Rhythm: Normal rate and regular rhythm.      Pulses: Normal pulses.      Heart sounds: Normal heart sounds.   Pulmonary:      Effort: Pulmonary effort is normal.      Breath sounds: Normal breath sounds.   Abdominal:      Palpations: Abdomen is soft.   Skin:     General: Skin is warm and dry.   Neurological:      General: No focal deficit present.      Mental Status: She is alert and oriented to person, place, and time. Mental status is at baseline.   Psychiatric:         Mood and Affect: Mood normal.         Behavior: Behavior normal.         Thought Content: Thought content normal.         Judgment: Judgment normal.         Assessment/Plan     Problem List Items Addressed This Visit       Essential hypertension    Overview     - Current meds: amlodipine 10 mg, lisinopril 40 mg, metoprolol  mg and spironolactone 100 mg          Current Assessment & Plan     - Has lost 53lbs since last year and BP trending down. Now experiencing some orthostatic hypotension  - Recommend she d/c the spironolactone and monitor BP at home. If still normal or low, taper off the metoprolol          Class 2 severe obesity due to excess calories with serious comorbidity and body mass index (BMI) of 38.0 to 38.9 in adult - Primary    Overview     - 4/5/24 - BMI = 47.39 kg/m2, Wt = 291 lbs. Started Wegovy (got it 4/21)   - 7/8/24 - BMI = 45.54 kg/ m2, Wt = 280 lbs. Down 11 lbs on Wegovy 0.25 mg. Now tapering up to Wegovy 0.5 mg   - 9/24/24 - BMI = 42.94 kg/m2, Wt = 264 lbs. Down 16 lbs more on Wegovy 1 mg  - 3/18/25 - BMI = 38.71 kg/m2, Wt = 238 lbs. Down 26 lbs more on Wegovy 1 mg.         Current Assessment & Plan     - Current weight: 108 kg (238 lb)  - Weight change since last visit (-) denotes wt loss -12 lbs   - Weight loss needed to achieve BMI 25: 84.6 Lbs  - Weight loss needed to achieve BMI 30: 53.9 Lbs  - Congratulated the pt on her healthy dietary and lifestyle  changes and encouraged her to continue  - Try to reduce saturated fats in the diet though as her cholesterol has worsened   - She will be talking with nutritionist soon through [facility]  - Will taper up Wegovy to 1.7 mg qwk         Relevant Medications    semaglutide, weight loss, (Wegovy) 1.7 mg/0.75 mL pen injector    Gastroesophageal reflux disease without esophagitis    Overview     - Current med: omeprazole 40 mg   - Taking due to side effect of Wegovy          Current Assessment & Plan     - Educated about risk of chronic use e.g. malabsorption (B12, Magnesium, iron)          Relevant Medications    omeprazole (PriLOSEC) 40 mg DR capsule    Renal insufficiency    Current Assessment & Plan     - New finding   - Check UACr - ordered   - May be due to diuretics and poor hydration   - Will stop the spironolactone as BP has improved with weight loss   - Encouraged she drink more water   - Her mom had renal cell carcinoma so low index for renal imaging          Relevant Orders    Albumin-Creatinine Ratio, Urine Random       I was present with the PA student who participated in the documentation of this note. I have personally seen and re-examined the patient and performed the medical decision-making components (assessment and plan of care). I have reviewed the PA student documentation and verified the findings in the note as written with additions or exceptions as stated in the body of this note.    Payton Garland PA-C

## 2025-03-19 DIAGNOSIS — K21.9 GASTROESOPHAGEAL REFLUX DISEASE WITHOUT ESOPHAGITIS: ICD-10-CM

## 2025-03-19 LAB
ALBUMIN/CREAT UR: 5 MG/G CREAT
CREAT UR-MCNC: 210 MG/DL (ref 20–275)
MICROALBUMIN UR-MCNC: 1.1 MG/DL

## 2025-03-19 RX ORDER — OMEPRAZOLE 40 MG/1
CAPSULE, DELAYED RELEASE ORAL
Qty: 90 CAPSULE | Refills: 1 | Status: SHIPPED | OUTPATIENT
Start: 2025-03-19

## 2025-03-21 LAB
1,25(OH)2D SERPL-MCNC: 14 PG/ML (ref 18–72)
1,25(OH)2D2 SERPL-MCNC: <8 PG/ML
1,25(OH)2D3 SERPL-MCNC: 14 PG/ML
ALBUMIN SERPL-MCNC: 4.4 G/DL (ref 3.6–5.1)
ALP SERPL-CCNC: 63 U/L (ref 37–153)
ALT SERPL-CCNC: 16 U/L (ref 6–29)
ANION GAP SERPL CALCULATED.4IONS-SCNC: 9 MMOL/L (CALC) (ref 7–17)
AST SERPL-CCNC: 22 U/L (ref 10–35)
BASOPHILS # BLD AUTO: 47 CELLS/UL (ref 0–200)
BASOPHILS NFR BLD AUTO: 0.6 %
BILIRUB SERPL-MCNC: 0.4 MG/DL (ref 0.2–1.2)
BUN SERPL-MCNC: 21 MG/DL (ref 7–25)
CALCIUM SERPL-MCNC: 9.8 MG/DL (ref 8.6–10.4)
CHLORIDE SERPL-SCNC: 102 MMOL/L (ref 98–110)
CHOLEST SERPL-MCNC: 204 MG/DL
CHOLEST/HDLC SERPL: 4.4 (CALC)
CO2 SERPL-SCNC: 26 MMOL/L (ref 20–32)
CREAT SERPL-MCNC: 1.1 MG/DL (ref 0.5–1.03)
EGFRCR SERPLBLD CKD-EPI 2021: 59 ML/MIN/1.73M2
EOSINOPHIL # BLD AUTO: 292 CELLS/UL (ref 15–500)
EOSINOPHIL NFR BLD AUTO: 3.7 %
ERYTHROCYTE [DISTWIDTH] IN BLOOD BY AUTOMATED COUNT: 11.4 % (ref 11–15)
EST. AVERAGE GLUCOSE BLD GHB EST-MCNC: 100 MG/DL
EST. AVERAGE GLUCOSE BLD GHB EST-SCNC: 5.5 MMOL/L
GLUCOSE SERPL-MCNC: 102 MG/DL (ref 65–99)
HBA1C MFR BLD: 5.1 % OF TOTAL HGB
HCT VFR BLD AUTO: 38.8 % (ref 35–45)
HDLC SERPL-MCNC: 46 MG/DL
HGB BLD-MCNC: 12.8 G/DL (ref 11.7–15.5)
LDLC SERPL CALC-MCNC: 132 MG/DL (CALC)
LYMPHOCYTES # BLD AUTO: 1991 CELLS/UL (ref 850–3900)
LYMPHOCYTES NFR BLD AUTO: 25.2 %
MCH RBC QN AUTO: 32.3 PG (ref 27–33)
MCHC RBC AUTO-ENTMCNC: 33 G/DL (ref 32–36)
MCV RBC AUTO: 98 FL (ref 80–100)
MONOCYTES # BLD AUTO: 450 CELLS/UL (ref 200–950)
MONOCYTES NFR BLD AUTO: 5.7 %
NEUTROPHILS # BLD AUTO: 5119 CELLS/UL (ref 1500–7800)
NEUTROPHILS NFR BLD AUTO: 64.8 %
NONHDLC SERPL-MCNC: 158 MG/DL (CALC)
PLATELET # BLD AUTO: 255 THOUSAND/UL (ref 140–400)
PMV BLD REES-ECKER: 10 FL (ref 7.5–12.5)
POTASSIUM SERPL-SCNC: 4.7 MMOL/L (ref 3.5–5.3)
PROT SERPL-MCNC: 7 G/DL (ref 6.1–8.1)
RBC # BLD AUTO: 3.96 MILLION/UL (ref 3.8–5.1)
SODIUM SERPL-SCNC: 137 MMOL/L (ref 135–146)
TRIGL SERPL-MCNC: 149 MG/DL
TSH SERPL-ACNC: 0.58 MIU/L (ref 0.4–4.5)
WBC # BLD AUTO: 7.9 THOUSAND/UL (ref 3.8–10.8)

## 2025-03-31 ENCOUNTER — DOCUMENTATION (OUTPATIENT)
Dept: PRIMARY CARE | Facility: CLINIC | Age: 55
End: 2025-03-31
Payer: COMMERCIAL

## 2025-03-31 DIAGNOSIS — F32.A ANXIETY AND DEPRESSION: Primary | ICD-10-CM

## 2025-03-31 DIAGNOSIS — F41.9 ANXIETY AND DEPRESSION: Primary | ICD-10-CM

## 2025-03-31 PROCEDURE — 99493 SBSQ PSYC COLLAB CARE MGMT: CPT | Performed by: PHYSICIAN ASSISTANT

## 2025-04-28 ENCOUNTER — APPOINTMENT (OUTPATIENT)
Dept: PRIMARY CARE | Facility: CLINIC | Age: 55
End: 2025-04-28
Payer: COMMERCIAL

## 2025-04-28 NOTE — PROGRESS NOTES
Collaborative Care (CoC)  Progress Note    Type of Interaction: Virtual    Start Time: 12:06pm    End Time: 12:40pm    Appointment: Not Scheduled    Reason for Visit:   Chief Complaint   Patient presents with    Depression    Anxiety     Interval History / Patient Symptoms:     Stable/some situational anxiety however overall feels coping well     Interventions Provided: [place] Setting, Acceptance & Commitment Therapy, Motivational Interviewing, Strengths Exploration, Values Exploration, Develop Coping Strategies, Review Progress/Goals Stress Management, Mindfulness, and Treatment Planning    Progress Made: Significant    Response to Intervention: Patient shared help with mother in the evenings and weekends has made her care easier. Some increased stress however coping well. Going to [employer] game tonight with friend. Overall, mentally feeling well. Writer encouraged nourishing meaningful relationships, moving body in a meaningful way, healthy balanced nutrition, a regular sleep schedule, and overall increased self-care to maintain mental health stability.      Plan: Graduated     Patient Instructions   Pt endorsed significant progress and stability. For that reason, pt is being closed from CoCM services at this time. If additional support needs arise in the future, pt can be referred back to programming.     Follow Up / Next Appointment:  if symptoms worsen or fail to improve

## 2025-04-28 NOTE — PATIENT INSTRUCTIONS
Pt endorsed significant progress and stability. For that reason, pt is being closed from [facility] services at this time. If additional support needs arise in the future, pt can be referred back to programming.

## 2025-04-30 ENCOUNTER — DOCUMENTATION (OUTPATIENT)
Dept: PRIMARY CARE | Facility: CLINIC | Age: 55
End: 2025-04-30
Payer: COMMERCIAL

## 2025-04-30 ENCOUNTER — DOCUMENTATION (OUTPATIENT)
Dept: PRIMARY CARE | Facility: CLINIC | Age: 55
End: 2025-04-30

## 2025-04-30 DIAGNOSIS — F41.9 ANXIETY AND DEPRESSION: Primary | ICD-10-CM

## 2025-04-30 DIAGNOSIS — F32.A ANXIETY AND DEPRESSION: Primary | ICD-10-CM

## 2025-04-30 PROCEDURE — 99493 SBSQ PSYC COLLAB CARE MGMT: CPT | Performed by: PHYSICIAN ASSISTANT

## 2025-05-19 ENCOUNTER — APPOINTMENT (OUTPATIENT)
Dept: PRIMARY CARE | Facility: CLINIC | Age: 55
End: 2025-05-19
Payer: COMMERCIAL

## 2025-05-19 VITALS
HEIGHT: 66 IN | TEMPERATURE: 97.5 F | HEART RATE: 91 BPM | DIASTOLIC BLOOD PRESSURE: 78 MMHG | SYSTOLIC BLOOD PRESSURE: 124 MMHG | BODY MASS INDEX: 38.25 KG/M2 | WEIGHT: 238 LBS | OXYGEN SATURATION: 98 % | RESPIRATION RATE: 18 BRPM

## 2025-05-19 DIAGNOSIS — Z13.9 SCREENING DUE: ICD-10-CM

## 2025-05-19 DIAGNOSIS — Z12.11 SCREENING FOR COLON CANCER: ICD-10-CM

## 2025-05-19 DIAGNOSIS — E66.01 CLASS 2 SEVERE OBESITY DUE TO EXCESS CALORIES WITH SERIOUS COMORBIDITY AND BODY MASS INDEX (BMI) OF 38.0 TO 38.9 IN ADULT: ICD-10-CM

## 2025-05-19 DIAGNOSIS — K21.9 GASTROESOPHAGEAL REFLUX DISEASE WITHOUT ESOPHAGITIS: ICD-10-CM

## 2025-05-19 DIAGNOSIS — E66.812 CLASS 2 SEVERE OBESITY DUE TO EXCESS CALORIES WITH SERIOUS COMORBIDITY AND BODY MASS INDEX (BMI) OF 38.0 TO 38.9 IN ADULT: ICD-10-CM

## 2025-05-19 DIAGNOSIS — R00.0 TACHYCARDIA: Primary | ICD-10-CM

## 2025-05-19 DIAGNOSIS — F32.A ANXIETY AND DEPRESSION: ICD-10-CM

## 2025-05-19 DIAGNOSIS — I10 ESSENTIAL HYPERTENSION: ICD-10-CM

## 2025-05-19 DIAGNOSIS — J45.20 MILD INTERMITTENT ASTHMA WITHOUT COMPLICATION (HHS-HCC): ICD-10-CM

## 2025-05-19 DIAGNOSIS — Z00.00 ANNUAL PHYSICAL EXAM: ICD-10-CM

## 2025-05-19 DIAGNOSIS — F41.9 ANXIETY AND DEPRESSION: ICD-10-CM

## 2025-05-19 PROBLEM — N28.9 RENAL INSUFFICIENCY: Status: RESOLVED | Noted: 2025-03-18 | Resolved: 2025-05-19

## 2025-05-19 PROBLEM — K60.2 ANAL FISSURE: Status: RESOLVED | Noted: 2023-05-24 | Resolved: 2025-05-19

## 2025-05-19 PROCEDURE — 3008F BODY MASS INDEX DOCD: CPT | Performed by: PHYSICIAN ASSISTANT

## 2025-05-19 PROCEDURE — 3078F DIAST BP <80 MM HG: CPT | Performed by: PHYSICIAN ASSISTANT

## 2025-05-19 PROCEDURE — 3074F SYST BP LT 130 MM HG: CPT | Performed by: PHYSICIAN ASSISTANT

## 2025-05-19 PROCEDURE — 99396 PREV VISIT EST AGE 40-64: CPT | Performed by: PHYSICIAN ASSISTANT

## 2025-05-19 RX ORDER — SEMAGLUTIDE 2.4 MG/.75ML
2.4 INJECTION, SOLUTION SUBCUTANEOUS
Qty: 2 ML | Refills: 3 | Status: SHIPPED | OUTPATIENT
Start: 2025-05-19 | End: 2025-05-19 | Stop reason: SDUPTHER

## 2025-05-19 NOTE — PROGRESS NOTES
Subjective   Patient ID: Phyllis Holbrook is a 54 y.o. female who presents for Annual Exam (Follow up heart racing. Patient states that fit bit warns her that her heart is racing and is intense. Patient can sometimes feel the racing of her heart. ).    HPI     Preventive:   - Labs: UTD   - Colon CA: due   - Mamm: UTD   - PAP: Due   - Shingrix: had shingles when she was a senior in high school   - Pneumovax/ Prevnar: UTD   - Td: 4/8/16 - next due 4/2026  - Diet: eating less - skips breakfast, coffee at work, has lunch at work (e.g. salad bar, BLT, pasta, sandwhich, chicken)  - Exercise: lunchroom is at the [facility] so has to walk there, [place] [facility] and around the lake back, it's 1.2 miles. Then when she comes home in the evening will walk the neighborhood. Bought an elliptical and has beewn doing that   - Sexual hx: not for 2 years   - Tobacco: no  - THC/cannabis: no  - Illicit drugs: no  - Alcohol: occasional/ infrequent   - Mood: good   - Dentist visits: utd   - Eye exams: utd     Heart racing:   - Ongoing the past few months  - Has a fitbit which has been showing heart rate in the 130s when she is resting  - Mom has afib   - no SOB   - Feels her heart pumping feeling occasionally, e.g. late at night or when she gets up in the morning  - No chest pain or indigestion (but on medicine)   - No exertional sxs as far as she can tell, but doing the elliptical for 30 min heart rate up to 175 but felt like she's just out of shape       Active Problem List  Patient Active Problem List   Diagnosis    Caregiver stress syndrome    Essential hypertension    Mild intermittent asthma without complication ([facility])    Anxiety and depression    Class 2 severe obesity due to excess calories with serious comorbidity and body mass index (BMI) of 38.0 to 38.9 in adult    Annual physical exam    Gastroesophageal reflux disease without esophagitis        Comprehensive  Medical/Surgical/Social/Family History  Social History     Socioeconomic History    Marital status:    Tobacco Use    Smoking status: Never    Smokeless tobacco: Never   Vaping Use    Vaping status: Never Used   Substance and Sexual Activity    Alcohol use: Yes     Comment: rarely    Drug use: Never     Social Drivers of Health     Financial Resource Strain: Low Risk  (4/7/2023)    Received from [facility]    Overall Financial Resource Strain (CARDIA)     Difficulty of Paying Living Expenses: Not very hard   Food Insecurity: No Food Insecurity (4/7/2023)    Received from [facility]    Hunger Vital Sign     Worried About Running Out of Food in the Last Year: Never true     Ran Out of Food in the Last Year: Never true   Transportation Needs: No Transportation Needs (4/7/2023)    Received from [facility]    PRAPARE - Transportation     Lack of Transportation (Medical): No     Lack of Transportation (Non-Medical): No   Physical Activity: Inactive (4/7/2023)    Received from [facility]    Exercise Vital Sign     Days of Exercise per Week: 0 days     Minutes of Exercise per Session: 10 min   Stress: Stress Concern Present (4/7/2023)    Received from [facility]    [employer] - Occupational Stress Questionnaire     Feeling of Stress : To some extent   Social Connections: Moderately Integrated (4/7/2023)    Received from [facility]    Social Connection and Isolation Panel [NHANES]     Frequency of Communication with Friends and Family: Twice a week     Frequency of Social Gatherings with Friends and Family: Once a week     Attends [employer] Services: 1 to 4 times per year     Active Member of Clubs or Organizations: Yes     Attends Club or Organization Meetings: 1 to 4 times per year     Marital Status:    Housing Stability: Unknown (4/7/2023)    Received from [facility]    Housing Stability Vital Sign     Unable to Pay for Housing in the  "Last Year: No     Unstable Housing in the Last Year: No        Family History   Problem Relation Name Age of Onset    Diabetes Mother      Hypertension Mother      Heart failure Mother      Kidney cancer Mother      Other (1 kidney removed) Mother      Other (vascular dementia) Mother      Esophageal cancer Father      Cancer Maternal Grandmother      Stroke Maternal Grandfather          Past Surgical History:   Procedure Laterality Date     SECTION, CLASSIC  1999    x1    FINGER SURGERY      right finger left hand    URETHRA EXCISION      took scar tissue out of Urethra          Allergies and Medication List  No Known Allergies       Current Outpatient Medications:     albuterol 90 mcg/actuation inhaler, Inhale 2 puffs every 6 hours if needed for wheezing., Disp: 18 g, Rfl: 3    amLODIPine (Norvasc) 10 mg tablet, TAKE 1 TABLET ONCE DAILY, Disp: 90 tablet, Rfl: 0    lisinopril 40 mg tablet, TAKE 1 TABLET ONCE DAILY, Disp: 90 tablet, Rfl: 0    multivitamin tablet, Take 1 tablet by mouth once daily., Disp: , Rfl:     nifedipine 0.2% ointment - Compounded - Outpatient, Apply a pea sized amount to anus twice daily, Disp: 30 g, Rfl: 2    omeprazole (PriLOSEC) 40 mg DR capsule, TAKE 1 CAPSULE BY MOUTH EVERY MORNING, TAKE BEFORE MEAL. DO NOT CRUSH OR CHEW, Disp: 90 capsule, Rfl: 1    semaglutide, weight loss, (Wegovy) 2.4 mg/0.75 mL pen injector, Inject 2.4 mg under the skin 1 (one) time per week., Disp: 2 mL, Rfl: 3       Review of Systems  Nothing concerning except as noted in the HPI    Objective   /78   Pulse 91   Temp 36.4 °C (97.5 °F) (Temporal)   Resp 18   Ht 1.67 m (5' 5.75\")   Wt 108 kg (238 lb)   SpO2 98%   BMI 38.71 kg/m²     Physical Exam  Constitutional:       General: She is not in acute distress.     Appearance: She is obese.   HENT:      Head: Normocephalic.      Right Ear: Tympanic membrane and ear canal normal.      Left Ear: Tympanic membrane and ear canal normal.      Nose: Nose " normal.      Mouth/Throat:      Mouth: Mucous membranes are moist.      Pharynx: Oropharynx is clear.   Eyes:      Extraocular Movements: Extraocular movements intact.      Conjunctiva/sclera: Conjunctivae normal.      Pupils: Pupils are equal, round, and reactive to light.   Neck:      Thyroid: No thyroid mass, thyromegaly or thyroid tenderness.      Vascular: No carotid bruit.   Cardiovascular:      Rate and Rhythm: Normal rate and regular rhythm.      Pulses: Normal pulses.      Heart sounds: No murmur heard.  Pulmonary:      Effort: Pulmonary effort is normal.      Breath sounds: Normal breath sounds. No wheezing, rhonchi or rales.   Abdominal:      General: Bowel sounds are normal. There is no distension.      Palpations: Abdomen is soft. There is no mass.      Tenderness: There is no abdominal tenderness. There is no guarding.   Musculoskeletal:         General: Normal range of motion.      Cervical back: Neck supple.   Lymphadenopathy:      Cervical: No cervical adenopathy.   Skin:     General: Skin is warm and dry.      Findings: No lesion or rash.   Neurological:      General: No focal deficit present.      Mental Status: She is alert.      Gait: Gait normal.   Psychiatric:         Mood and Affect: Mood normal.         Assessment/Plan     Problem List Items Addressed This Visit       Essential hypertension    Overview   - Current meds: amlodipine 10 mg, lisinopril 40 mg  - metoprolol  mg (hypotension, dizzy), spironolactone 100 mg (hypotension, dizzy spells)          Current Assessment & Plan   - Stable and well controlled  - Continue current tx plan           Mild intermittent asthma without complication ([facility])    Overview   - Current med: albuterol prn (just needs when she gets sick)         Current Assessment & Plan   - Stable and well controlled  - Continue current tx plan           Anxiety and depression    Overview   - Historical med: Wellbutrin (constipation), Paxil (constipation)   - Saw  Jacy - now just prn          Current Assessment & Plan   - Stable and well controlled  - Continue current tx plan           Class 2 severe obesity due to excess calories with serious comorbidity and body mass index (BMI) of 38.0 to 38.9 in adult    Overview   - 4/5/24 - BMI = 47.39 kg/m2, Wt = 291 lbs. Started Wegovy (got it 4/21)   - 7/8/24 - BMI = 45.54 kg/ m2, Wt = 280 lbs. Down 11 lbs on Wegovy 0.25 mg. Now tapering up to Wegovy 0.5 mg   - 9/24/24 - BMI = 42.94 kg/m2, Wt = 264 lbs. Down 16 lbs more on Wegovy 1 mg  - 3/18/25 - BMI = 38.71 kg/m2, Wt = 238 lbs. Down 26 lbs more on Wegovy 1 mg.  - 5/19/25 - BMI = 38.71 kg/m2, Wt = 238 lbs. Same as prior on Wegovy 1.7 mg.          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   - Mamm neg 12/18/23 - next due 12/2024  - Tdap 4/8/26 - next due 4/2026         Current Assessment & Plan   PREVENTIVE CARE SCREENING:  - Labs: UTD  - Cologuard/ Colonoscopy: Due, declined colonoscopy, Cologuard ordered   Vaccines:   - Shingles Vaccine (start at 50): Due, declined   - Pneumonia vax: UTD   - Tetanus (q10yrs): UTD   Women's health:  - PAP: Due, advised she schedule appt for this   - Mammogram: UTD   Lifestyle Modification:  - Discussed DIET - encouraged whole food diet with lots of vegetables, legumes, whole grains, fruits, lean meats, avoid processed foods, high saturated fat/greasy foods, sugary foods and drinks, fast foods/dining out.   - Discussed EXERCISE -   Recommended exercise 3-7 days a week - weight training for bone health and  cardiovascular exercise.   - Avoid or quit cigarette smoking   - Limit or abstain from alcohol consumption   - Encouraged pt to get yearly eye and dental exams          Gastroesophageal reflux disease without esophagitis    Overview   - Current med: omeprazole 40 mg   - Taking due to side effect of Wegovy          Current Assessment & Plan   - Stable and well controlled  - Continue current tx plan            Other Visit Diagnoses         Tachycardia    -  Primary    Relevant Orders    Holter Monitor >48 Hours - 7 Days - No Charges    Transthoracic Echo (TTE) Complete    Referral to Cardiology      Screening for colon cancer        Relevant Orders    Cologuard® colon cancer screening      Screening due        Relevant Orders    Follow Up In [facility] - PCP                          Phyllis Holbrook - be aware that any referrals discussed should be placed today and tests that were ordered should result in prompt scheduling today.   If not done today-then a phone call for scheduling is expected in a timely manner (within 2 weeks).   If testing is to be done-a result should be available to patient within 2 weeks time unless otherwise specified.   You, the patient or caregiver, are responsible for making sure that what was discussed is actually scheduled and completed.  If suboptimal understanding of results of tests or referral reason-a follow up appointment with me should be made.  If above does not occur-you are to connect with us for an explanation.

## 2025-05-19 NOTE — ASSESSMENT & PLAN NOTE
PREVENTIVE CARE SCREENING:  - Labs: UTD  - Cologuard/ Colonoscopy: Due, declined colonoscopy, Cologuard ordered   Vaccines:   - Shingles Vaccine (start at 50): Due, declined   - Pneumonia vax: UTD   - Tetanus (q10yrs): UTD   Women's health:  - PAP: Due, advised she schedule appt for this   - Mammogram: UTD   Lifestyle Modification:  - Discussed DIET - encouraged whole food diet with lots of vegetables, legumes, whole grains, fruits, lean meats, avoid processed foods, high saturated fat/greasy foods, sugary foods and drinks, fast foods/dining out.   - Discussed EXERCISE -   Recommended exercise 3-7 days a week - weight training for bone health and cardiovascular exercise.   - Avoid or quit cigarette smoking   - Limit or abstain from alcohol consumption   - Encouraged pt to get yearly eye and dental exams

## 2025-05-21 ENCOUNTER — TELEPHONE (OUTPATIENT)
Dept: PRIMARY CARE | Facility: CLINIC | Age: 55
End: 2025-05-21
Payer: COMMERCIAL

## 2025-05-21 DIAGNOSIS — R00.0 TACHYCARDIA: ICD-10-CM

## 2025-05-21 RX ORDER — SEMAGLUTIDE 2.4 MG/.75ML
2.4 INJECTION, SOLUTION SUBCUTANEOUS
Qty: 2 ML | Refills: 3 | Status: SHIPPED | OUTPATIENT
Start: 2025-05-25

## 2025-05-21 NOTE — ASSESSMENT & PLAN NOTE
- Plan to taper up Wegovy to 2.4 mg qwk and see how she does   - Encouraged continued efforts at healthy diet, caloric restriction and regular exercise

## 2025-05-21 NOTE — TELEPHONE ENCOUNTER
Bruce Garland,     Cariology called me and scheduled the Echo and my first appointment with a cardiologist.  However, the order for the Holter monitor was not put through.  I explained to the lady that my paper said the order was placed but she did not have anything in for the Holter monitor.  Can you please resubmit this request on my behalf?     Thanks,     Ladonna Holbrook

## 2025-05-26 LAB — NONINV COLON CA DNA+OCC BLD SCRN STL QL: NORMAL

## 2025-05-30 ENCOUNTER — HOSPITAL ENCOUNTER (OUTPATIENT)
Dept: CARDIOLOGY | Facility: CLINIC | Age: 55
Discharge: HOME | End: 2025-05-30
Payer: COMMERCIAL

## 2025-05-30 DIAGNOSIS — R00.0 TACHYCARDIA: ICD-10-CM

## 2025-05-30 LAB
AORTIC VALVE MEAN GRADIENT: 6 MMHG
AORTIC VALVE PEAK VELOCITY: 1.55 M/S
AV PEAK GRADIENT: 10 MMHG
AVA (PEAK VEL): 2.01 CM2
AVA (VTI): 2.28 CM2
EJECTION FRACTION APICAL 4 CHAMBER: 58.2
EJECTION FRACTION: 58 %
LEFT VENTRICLE INTERNAL DIMENSION DIASTOLE: 4.8 CM (ref 3.5–6)
LEFT VENTRICULAR OUTFLOW TRACT DIAMETER: 2 CM
LV EJECTION FRACTION BIPLANE: 57 %
MITRAL VALVE E/A RATIO: 0.92
MITRAL VALVE E/E' RATIO: 7.2
RIGHT VENTRICLE FREE WALL PEAK S': 9.7 CM/S
RIGHT VENTRICLE PEAK SYSTOLIC PRESSURE: 34 MMHG
TRICUSPID ANNULAR PLANE SYSTOLIC EXCURSION: 2.6 CM

## 2025-05-30 PROCEDURE — 93306 TTE W/DOPPLER COMPLETE: CPT

## 2025-05-30 PROCEDURE — 93306 TTE W/DOPPLER COMPLETE: CPT | Performed by: INTERNAL MEDICINE

## 2025-06-02 DIAGNOSIS — I27.20 PULMONARY HYPERTENSION (MULTI): Primary | ICD-10-CM

## 2025-06-03 ENCOUNTER — APPOINTMENT (OUTPATIENT)
Dept: CARDIOLOGY | Facility: CLINIC | Age: 55
End: 2025-06-03
Payer: COMMERCIAL

## 2025-06-03 VITALS
SYSTOLIC BLOOD PRESSURE: 136 MMHG | HEART RATE: 86 BPM | BODY MASS INDEX: 37.83 KG/M2 | DIASTOLIC BLOOD PRESSURE: 84 MMHG | HEIGHT: 66 IN | WEIGHT: 235.4 LBS

## 2025-06-03 DIAGNOSIS — F41.9 ANXIETY AND DEPRESSION: ICD-10-CM

## 2025-06-03 DIAGNOSIS — R06.09 DYSPNEA ON EXERTION: ICD-10-CM

## 2025-06-03 DIAGNOSIS — F32.A ANXIETY AND DEPRESSION: ICD-10-CM

## 2025-06-03 DIAGNOSIS — R06.83 SNORES: ICD-10-CM

## 2025-06-03 DIAGNOSIS — E66.01 CLASS 2 SEVERE OBESITY DUE TO EXCESS CALORIES WITH SERIOUS COMORBIDITY AND BODY MASS INDEX (BMI) OF 38.0 TO 38.9 IN ADULT: ICD-10-CM

## 2025-06-03 DIAGNOSIS — E66.812 CLASS 2 SEVERE OBESITY DUE TO EXCESS CALORIES WITH SERIOUS COMORBIDITY AND BODY MASS INDEX (BMI) OF 38.0 TO 38.9 IN ADULT: ICD-10-CM

## 2025-06-03 DIAGNOSIS — R00.0 TACHYCARDIA: ICD-10-CM

## 2025-06-03 DIAGNOSIS — R06.02 SHORTNESS OF BREATH: ICD-10-CM

## 2025-06-03 DIAGNOSIS — Z82.5 FAMILY HISTORY OF ASTHMA: ICD-10-CM

## 2025-06-03 DIAGNOSIS — I10 ESSENTIAL HYPERTENSION: ICD-10-CM

## 2025-06-03 PROCEDURE — 93000 ELECTROCARDIOGRAM COMPLETE: CPT | Performed by: INTERNAL MEDICINE

## 2025-06-03 PROCEDURE — 3079F DIAST BP 80-89 MM HG: CPT | Performed by: INTERNAL MEDICINE

## 2025-06-03 PROCEDURE — 3075F SYST BP GE 130 - 139MM HG: CPT | Performed by: INTERNAL MEDICINE

## 2025-06-03 PROCEDURE — 99205 OFFICE O/P NEW HI 60 MIN: CPT | Performed by: INTERNAL MEDICINE

## 2025-06-03 PROCEDURE — 3008F BODY MASS INDEX DOCD: CPT | Performed by: INTERNAL MEDICINE

## 2025-06-03 RX ORDER — ALBUTEROL SULFATE 90 UG/1
1 INHALANT RESPIRATORY (INHALATION) ONCE
OUTPATIENT
Start: 2025-06-03

## 2025-06-03 RX ORDER — ASPIRIN 81 MG/1
81 TABLET ORAL DAILY
COMMUNITY

## 2025-06-03 RX ORDER — REGADENOSON 0.08 MG/ML
0.4 INJECTION, SOLUTION INTRAVENOUS
OUTPATIENT
Start: 2025-06-03

## 2025-06-03 RX ORDER — AMINOPHYLLINE 25 MG/ML
125 INJECTION, SOLUTION INTRAVENOUS ONCE AS NEEDED
OUTPATIENT
Start: 2025-06-03

## 2025-06-03 RX ORDER — ALBUTEROL SULFATE 0.83 MG/ML
3 SOLUTION RESPIRATORY (INHALATION) ONCE
OUTPATIENT
Start: 2025-06-03 | End: 2025-06-03

## 2025-06-03 NOTE — PROGRESS NOTES
CARDIOLOGY CONSULTATION NOTE       Patient:    Phyllis Holbrook    YOB: 1970  MRN:    [id number]    Date:   6/3/2025        REASON FOR CONSULT / CHIEF COMPLAINT:      Cardiology consultation for symptomatic tachycardia.    IMPRESSION:      Dyspnea on exertion  Palpitations  Lightheadedness  Snores  Tachycardia  Normal LV systolic function, LVEF 55 to 60%, echocardiogram, 2025.  LV diastolic dysfunction.  Mitral regurgitation, mild  Hypertension  Hyperglycemia  Pulmonary hypertension, mild, RVSP 39  Asthma  Renal insufficiency, mild  Anxiety  Depression  GERD  History of   Obesity, on Wegovy treatment.  Family history of coronary artery disease  Otherwise as per assessment below.    RECOMMENDATIONS:      Patient has above-noted history and findings.  She has significant tachycardia symptomatic with minimal activity.  Her Fitbit tells her that she goes up into the 1940 range.  Given her family history and cardiac risk factors of hypertension hyperlipidemia and borderline diabetes would suggest the following testing: Lexiscan Myoview perfusion stress test, 48-hour Holter monitor, CT calcium scoring.  Further recommendation rendered following review.    Will not start any new medications as she has been in the past intolerant to metoprolol.  She is on calcium channel blockers in the form of amlodipine.  She will continue her current medications.    Exercise dietary program.    Hydration.    [employer] portal use was encouraged.    We will plan to see back following the above testing with Laboratory Studies and ECG as noted.     Patient will follow up with their primary physician for general care.    The patient knows to contact medical care earlier if need be.      HPI:     Phyllis Holbrook was seen in cardiac evaluation at the [facility] Cardiology office Nina 3, 2025.      The patients problems are listed as in the impression above.    Electronic medical records reviewed.    The  patient is a pleasant 55-year-old hypertensive, hyperlipidemic, borderline diabetic woman with prior history of asthma but no prior cardiovascular history who as noted episodes of palpitations and fast heart rates.  She states that it is with the limited activity.  Heart rates will go up into the 130s.  She admits to dyspnea on exertion.  She does have snoring.  She denies any fatigue or witnessed apnea.  Primary service is already ordering a home sleep study.    Patient has been on Wegovy and GLP-1 treatment in has lost 60 pounds.  She is active gym on the [employer].    She is otherwise without limitations.    Patient denies Chest Pain, Lightheadedness, Dizziness, TIA or CVA symptoms.  No CHF or Edema.  No GI,  or Bleeding Issues. No Recent Fever or Chills.     Cardiovascular and general review of systems is otherwise negative.    A 14-system review is otherwise negative, other than noted.    ALLERGIES:     Patient has no known allergies.    MEDICATIONS:     Current Outpatient Medications   Medication Instructions    albuterol 90 mcg/actuation inhaler 2 puffs, inhalation, Every 6 hours PRN    amLODIPine (NORVASC) 10 mg, oral, Daily    aspirin 81 mg, Daily    lisinopril 40 mg, oral, Daily    multivitamin tablet 1 tablet, Daily    nifedipine 0.2% ointment - Compounded - Outpatient Apply a pea sized amount to anus twice daily    omeprazole (PriLOSEC) 40 mg DR capsule TAKE 1 CAPSULE BY MOUTH EVERY MORNING, TAKE BEFORE MEAL. DO NOT CRUSH OR CHEW    Wegovy 2.4 mg, subcutaneous, Once Weekly       PAST MEDICAL HISTORY:   As per impression above.  No other significant past medical or surgical history appreciated.    SOCIAL HISTORY:   .  3 children.  [place].  Denies tobacco.    Occasional alcohol use.    No illicit drug use.    FAMILY HISTORY:   Positive family history of CAD, heart failure and atrial fibrillation    VITALS:     Vitals:    06/03/25 1442   BP: 136/84   Pulse: 86       Wt Readings from  Last 4 Encounters:   06/03/25 107 kg (235 lb 6.4 oz)   05/19/25 108 kg (238 lb)   03/18/25 108 kg (238 lb)   12/17/24 112 kg (248 lb)       PHYSICAL EXAMINATION:      General: No acute distress. Vital signs as noted. Alert and oriented.  Head And Neck Examination: No jugular venous distention, no carotid bruits, no mass. Carotid upstrokes preserved. Oral mucosa moist.  No xanthelasma. Head and neck examination otherwise unremarkable.  Lungs: Clear to auscultation and percussion. No wheezes, no rales,  and no rhonchi.  Chest: Excursion appeared to be normal. No chest wall tenderness on palpation.  Heart: Normal S1 and S2. No S3. No S4. No rub. Grade 1/6 systolic murmur, best heard at the left sternal border. Point of maximal impulse was within normal limits.  Abdomen: Soft. Nontender. No organomegaly. No bruits. No masses. Obese.  Extremities: No bipedal edema. No clubbing. No cyanosis.  Pulses are strong throughout. No bruits.  Musculoskeletal Exam: No ulcers, otherwise unremarkable.  Neuro: Neurologically appeared grossly intact.    ELECTROCARDIOGRAM:      None sinus rhythm, poor wave anterior progression.  Rate 86.    CARDIAC TESTING:      None echocardiogram, 5/2025:  Normal LV function.  LVEF 55 to 60%.  LV diastolic dysfunction  Normal chamber sizes  1+ mitral regurgitation  Heart rate 39.    LABORATORY DATA:      CBC:   Lab Results   Component Value Date    WBC 7.9 03/14/2025    RBC 3.96 03/14/2025    HGB 12.8 03/14/2025    HCT 38.8 03/14/2025     03/14/2025        CMP:    Lab Results   Component Value Date     03/14/2025    K 4.7 03/14/2025     03/14/2025    CO2 26 03/14/2025    BUN 21 03/14/2025    CREATININE 1.10 (H) 03/14/2025    GLUCOSE 102 (H) 03/14/2025    CALCIUM 9.8 03/14/2025     Lipid Profile:    Lab Results   Component Value Date    CHOL 204 (H) 03/14/2025    TRIG 149 03/14/2025    HDL 46 (L) 03/14/2025    LDLCALC 132 (H) 03/14/2025       Hepatic Function Panel:    Lab Results    Component Value Date    ALKPHOS 63 03/14/2025    ALT 16 03/14/2025    AST 22 03/14/2025    PROT 7.0 03/14/2025    BILITOT 0.4 03/14/2025       TSH:    Lab Results   Component Value Date    TSH 0.58 03/14/2025       HgBA1c:    Lab Results   Component Value Date    HGBA1C 5.1 03/14/2025               PROBLEM LIST:     Problem List[1]          Richy Hernández MD, FACC   [facility] /  Cardiology      Of Note:  [employer] Voiceboard voice recognition dictation software was utilized partially in the preparation of this note, therefore, inaccuracies in spelling, word choice and punctuation may have occurred which were not recognized at the time of signing.    Patient was seen and examined with total time of visit including chart preparation, rooming, and chart completion exceeding 40 minutes.      I, Estelle Eastman MA am scribing for, and in the presence of Dr. Richy Hernández MD, [facility].    I, Dr. Richy Hernández MD, [facility], personally performed the services described in the documentation as scribed by Estelle Eastman MA in my presence, and confirm it is both accurate and complete.              [1]   Patient Active Problem List  Diagnosis    Caregiver stress syndrome    Essential hypertension    Mild intermittent asthma without complication ([facility])    Anxiety and depression    Class 2 severe obesity due to excess calories with serious comorbidity and body mass index (BMI) of 38.0 to 38.9 in adult    Annual physical exam    Gastroesophageal reflux disease without esophagitis

## 2025-06-03 NOTE — PATIENT INSTRUCTIONS
THE DAY YOU COME IN FOR YOUR HOLTER MONITOR BE SURE TO BATHE OR SHOWER PRIOR.  DO NOT APPLY ANY LOTIONS, OILS OR POWDERS ON YOUR CHEST AREA.  ONCE THE MONITOR IS APPLIED IT WILL REMAIN ON UNTIL THE STUDY IS DONE. YOU WILL NOT BE ABLE TO SHOWER. THE MONITOR CANNOT GET WET.      Dr. Hernández would like you to watch your diet, exercise and hydrate     FOLLOW UP AFTER TESTING     DID YOU KNOW  We have a pharmacy here in the [facility].  They can fill all prescriptions, not just cardiac medications.  Prescriptions from other pharmacies can easily be transferred to the  pharmacy by the  pharmacist on site.   pharmacies offer FREE HOME DELIVERY on medications to anywhere in [place]. They can sync your medications. Typically prescriptions can be ready in 10 - 15 minutes. If pharmacy is unable to fill your  prescription or if cost is more than your paying now the Pharmacist can easily transfer back to your Pharmacy of choice. Pharmacy phone # [phone].      Please bring all medicines, vitamins, and herbal supplements with you in original bottles to every appointment  Prescriptions will not be filled unless you are compliant with your follow up appointments or have a follow up appointment scheduled as per instruction of your physician.   Refills should be requested at the time of your visit.

## 2025-06-04 ENCOUNTER — TELEPHONE (OUTPATIENT)
Dept: CARDIOLOGY | Facility: CLINIC | Age: 55
End: 2025-06-04
Payer: COMMERCIAL

## 2025-06-04 NOTE — TELEPHONE ENCOUNTER
Patient called to confirm that she checked her bottles at home and is taking:  Amlodipine 10mg once daily   Lisinopril 40mg once daily

## 2025-06-08 DIAGNOSIS — I10 ESSENTIAL HYPERTENSION: ICD-10-CM

## 2025-06-08 LAB — NONINV COLON CA DNA+OCC BLD SCRN STL QL: NEGATIVE

## 2025-06-10 RX ORDER — LISINOPRIL 40 MG/1
40 TABLET ORAL DAILY
Qty: 90 TABLET | Refills: 0 | Status: SHIPPED | OUTPATIENT
Start: 2025-06-10

## 2025-06-10 RX ORDER — AMLODIPINE BESYLATE 10 MG/1
10 TABLET ORAL DAILY
Qty: 90 TABLET | Refills: 0 | Status: SHIPPED | OUTPATIENT
Start: 2025-06-10

## 2025-06-20 ENCOUNTER — APPOINTMENT (OUTPATIENT)
Dept: PRIMARY CARE | Facility: CLINIC | Age: 55
End: 2025-06-20
Payer: COMMERCIAL

## 2025-06-26 ENCOUNTER — HOSPITAL ENCOUNTER (OUTPATIENT)
Dept: RESPIRATORY THERAPY | Facility: HOSPITAL | Age: 55
Discharge: HOME | End: 2025-06-26
Payer: COMMERCIAL

## 2025-06-26 DIAGNOSIS — R06.09 DYSPNEA ON EXERTION: ICD-10-CM

## 2025-06-26 DIAGNOSIS — Z82.5 FAMILY HISTORY OF ASTHMA: ICD-10-CM

## 2025-06-26 LAB
MGC ASCENT PFT - FEV1 - POST: 2.65
MGC ASCENT PFT - FEV1 - PRE: 2.55
MGC ASCENT PFT - FEV1 - PREDICTED: 2.64
MGC ASCENT PFT - FVC - POST: 3.53
MGC ASCENT PFT - FVC - PRE: 3.53
MGC ASCENT PFT - FVC - PREDICTED: 3.31

## 2025-06-26 PROCEDURE — 2500000002 HC RX 250 W HCPCS SELF ADMINISTERED DRUGS (ALT 637 FOR MEDICARE OP, ALT 636 FOR OP/ED): Performed by: INTERNAL MEDICINE

## 2025-06-26 PROCEDURE — 94640 AIRWAY INHALATION TREATMENT: CPT

## 2025-06-26 PROCEDURE — 94726 PLETHYSMOGRAPHY LUNG VOLUMES: CPT

## 2025-06-26 RX ORDER — ALBUTEROL SULFATE 90 UG/1
1 INHALANT RESPIRATORY (INHALATION) ONCE
Status: COMPLETED | OUTPATIENT
Start: 2025-06-26 | End: 2025-06-26

## 2025-06-26 RX ORDER — ALBUTEROL SULFATE 0.83 MG/ML
3 SOLUTION RESPIRATORY (INHALATION) ONCE
Status: COMPLETED | OUTPATIENT
Start: 2025-06-26 | End: 2025-06-26

## 2025-06-26 RX ADMIN — ALBUTEROL SULFATE 3 ML: 2.5 SOLUTION RESPIRATORY (INHALATION) at 10:16

## 2025-06-30 ENCOUNTER — CLINICAL SUPPORT (OUTPATIENT)
Dept: SLEEP MEDICINE | Facility: HOSPITAL | Age: 55
End: 2025-06-30
Payer: COMMERCIAL

## 2025-06-30 DIAGNOSIS — I27.20 PULMONARY HYPERTENSION (MULTI): ICD-10-CM

## 2025-06-30 DIAGNOSIS — G47.10 HYPERSOMNIA, UNSPECIFIED: ICD-10-CM

## 2025-06-30 PROCEDURE — 95806 SLEEP STUDY UNATT&RESP EFFT: CPT | Performed by: PSYCHIATRY & NEUROLOGY

## 2025-06-30 NOTE — PROGRESS NOTES
Type of Study: HOME SLEEP STUDY - NOMAD     The patient received equipment and instructions for use of the [facility] Nomad HSAT 4007 device. The patient was instructed how to apply the effort belts, cannula, thermistor. It was also explained how the Nomad and oximeter components work.  The patient was asked to record their sleep for an 8-hour period.     The patient was informed of their responsibility for the device and acknowledged this by signing the HSAT device contract. The patient was asked to return the device on 7/1/2025 between the hours of 9am to the Sleep Center.     The patient was instructed to call 911 as usual for any medical- emergencies while at home.  The patient was also given a phone number for troubleshooting when using the device in case there were additional questions.

## 2025-07-21 ENCOUNTER — ANCILLARY PROCEDURE (OUTPATIENT)
Facility: HOSPITAL | Age: 55
End: 2025-07-21
Payer: COMMERCIAL

## 2025-07-21 DIAGNOSIS — R06.02 SHORTNESS OF BREATH: ICD-10-CM

## 2025-07-21 DIAGNOSIS — R00.0 TACHYCARDIA: ICD-10-CM

## 2025-07-21 PROCEDURE — 75571 CT HRT W/O DYE W/CA TEST: CPT

## 2025-08-04 ENCOUNTER — ANCILLARY PROCEDURE (OUTPATIENT)
Dept: CARDIOLOGY | Facility: HOSPITAL | Age: 55
End: 2025-08-04
Payer: COMMERCIAL

## 2025-08-04 ENCOUNTER — APPOINTMENT (OUTPATIENT)
Dept: CARDIOLOGY | Facility: CLINIC | Age: 55
End: 2025-08-04
Payer: COMMERCIAL

## 2025-08-04 DIAGNOSIS — R06.02 SHORTNESS OF BREATH: ICD-10-CM

## 2025-08-04 DIAGNOSIS — R00.0 TACHYCARDIA: ICD-10-CM

## 2025-08-04 PROCEDURE — A9502 TC99M TETROFOSMIN: HCPCS | Performed by: INTERNAL MEDICINE

## 2025-08-04 PROCEDURE — 93016 CV STRESS TEST SUPVJ ONLY: CPT | Performed by: INTERNAL MEDICINE

## 2025-08-04 PROCEDURE — 3430000001 HC RX 343 DIAGNOSTIC RADIOPHARMACEUTICALS: Performed by: INTERNAL MEDICINE

## 2025-08-04 PROCEDURE — 2500000004 HC RX 250 GENERAL PHARMACY W/ HCPCS (ALT 636 FOR OP/ED): Performed by: INTERNAL MEDICINE

## 2025-08-04 PROCEDURE — 93018 CV STRESS TEST I&R ONLY: CPT | Performed by: INTERNAL MEDICINE

## 2025-08-04 PROCEDURE — 78452 HT MUSCLE IMAGE SPECT MULT: CPT | Performed by: INTERNAL MEDICINE

## 2025-08-04 PROCEDURE — 93017 CV STRESS TEST TRACING ONLY: CPT

## 2025-08-04 RX ORDER — AMINOPHYLLINE 25 MG/ML
125 INJECTION, SOLUTION INTRAVENOUS ONCE AS NEEDED
Status: SHIPPED | OUTPATIENT
Start: 2025-08-04

## 2025-08-04 RX ORDER — REGADENOSON 0.08 MG/ML
0.4 INJECTION, SOLUTION INTRAVENOUS
Status: COMPLETED | OUTPATIENT
Start: 2025-08-04 | End: 2025-08-04

## 2025-08-04 RX ADMIN — TETROFOSMIN 35 MILLICURIE: 0.23 INJECTION, POWDER, LYOPHILIZED, FOR SOLUTION INTRAVENOUS at 09:53

## 2025-08-04 RX ADMIN — TETROFOSMIN 10.4 MILLICURIE: 0.23 INJECTION, POWDER, LYOPHILIZED, FOR SOLUTION INTRAVENOUS at 08:18

## 2025-08-04 RX ADMIN — REGADENOSON 0.4 MG: 0.08 INJECTION, SOLUTION INTRAVENOUS at 09:52

## 2025-08-04 RX ADMIN — AMINOPHYLLINE 125 MG: 25 INJECTION, SOLUTION INTRAVENOUS at 09:57

## 2025-08-18 ENCOUNTER — APPOINTMENT (OUTPATIENT)
Dept: CARDIOLOGY | Facility: CLINIC | Age: 55
End: 2025-08-18
Payer: COMMERCIAL

## 2025-08-18 VITALS
BODY MASS INDEX: 36.83 KG/M2 | SYSTOLIC BLOOD PRESSURE: 138 MMHG | HEART RATE: 81 BPM | HEIGHT: 66 IN | DIASTOLIC BLOOD PRESSURE: 76 MMHG | WEIGHT: 229.2 LBS

## 2025-08-18 DIAGNOSIS — R06.09 DYSPNEA ON EXERTION: ICD-10-CM

## 2025-08-18 DIAGNOSIS — E66.812 CLASS 2 SEVERE OBESITY DUE TO EXCESS CALORIES WITH SERIOUS COMORBIDITY AND BODY MASS INDEX (BMI) OF 38.0 TO 38.9 IN ADULT: ICD-10-CM

## 2025-08-18 DIAGNOSIS — Z82.5 FAMILY HISTORY OF ASTHMA: ICD-10-CM

## 2025-08-18 DIAGNOSIS — R06.02 SHORTNESS OF BREATH: ICD-10-CM

## 2025-08-18 DIAGNOSIS — I47.19 PAT (PAROXYSMAL ATRIAL TACHYCARDIA): Primary | ICD-10-CM

## 2025-08-18 DIAGNOSIS — I10 ESSENTIAL HYPERTENSION: ICD-10-CM

## 2025-08-18 DIAGNOSIS — F41.9 ANXIETY AND DEPRESSION: ICD-10-CM

## 2025-08-18 DIAGNOSIS — F32.A ANXIETY AND DEPRESSION: ICD-10-CM

## 2025-08-18 DIAGNOSIS — I49.3 VENTRICULAR ECTOPY: ICD-10-CM

## 2025-08-18 DIAGNOSIS — R00.0 TACHYCARDIA: ICD-10-CM

## 2025-08-18 DIAGNOSIS — E66.01 CLASS 2 SEVERE OBESITY DUE TO EXCESS CALORIES WITH SERIOUS COMORBIDITY AND BODY MASS INDEX (BMI) OF 38.0 TO 38.9 IN ADULT: ICD-10-CM

## 2025-08-18 DIAGNOSIS — R06.83 SNORES: ICD-10-CM

## 2025-08-18 PROCEDURE — 99214 OFFICE O/P EST MOD 30 MIN: CPT | Performed by: INTERNAL MEDICINE

## 2025-08-18 PROCEDURE — 1036F TOBACCO NON-USER: CPT | Performed by: INTERNAL MEDICINE

## 2025-08-18 PROCEDURE — 3075F SYST BP GE 130 - 139MM HG: CPT | Performed by: INTERNAL MEDICINE

## 2025-08-18 PROCEDURE — 3078F DIAST BP <80 MM HG: CPT | Performed by: INTERNAL MEDICINE

## 2025-08-18 PROCEDURE — 3008F BODY MASS INDEX DOCD: CPT | Performed by: INTERNAL MEDICINE

## 2025-08-18 RX ORDER — AMLODIPINE BESYLATE 10 MG/1
10 TABLET ORAL DAILY
Qty: 90 TABLET | Refills: 3 | Status: SHIPPED | OUTPATIENT
Start: 2025-08-18 | End: 2026-08-18

## 2025-08-18 RX ORDER — LISINOPRIL 40 MG/1
40 TABLET ORAL DAILY
Qty: 90 TABLET | Refills: 3 | Status: SHIPPED | OUTPATIENT
Start: 2025-08-18 | End: 2026-08-18

## 2025-08-18 RX ORDER — METOPROLOL SUCCINATE 25 MG/1
25 TABLET, EXTENDED RELEASE ORAL DAILY
Qty: 90 TABLET | Refills: 3 | Status: SHIPPED | OUTPATIENT
Start: 2025-08-18 | End: 2026-08-18

## 2025-08-18 RX ORDER — LANOLIN ALCOHOL/MO/W.PET/CERES
400 CREAM (GRAM) TOPICAL DAILY
Qty: 90 TABLET | Refills: 3 | Status: SHIPPED | OUTPATIENT
Start: 2025-08-18 | End: 2026-08-18

## 2025-08-19 ENCOUNTER — APPOINTMENT (OUTPATIENT)
Dept: PRIMARY CARE | Facility: CLINIC | Age: 55
End: 2025-08-19
Payer: COMMERCIAL

## 2025-08-19 VITALS
BODY MASS INDEX: 36.32 KG/M2 | WEIGHT: 226 LBS | HEIGHT: 66 IN | HEART RATE: 92 BPM | DIASTOLIC BLOOD PRESSURE: 70 MMHG | SYSTOLIC BLOOD PRESSURE: 118 MMHG | OXYGEN SATURATION: 98 % | TEMPERATURE: 97.5 F | RESPIRATION RATE: 16 BRPM

## 2025-08-19 DIAGNOSIS — E66.01 CLASS 2 SEVERE OBESITY DUE TO EXCESS CALORIES WITH SERIOUS COMORBIDITY AND BODY MASS INDEX (BMI) OF 38.0 TO 38.9 IN ADULT: ICD-10-CM

## 2025-08-19 DIAGNOSIS — E66.812 CLASS 2 SEVERE OBESITY DUE TO EXCESS CALORIES WITH SERIOUS COMORBIDITY AND BODY MASS INDEX (BMI) OF 38.0 TO 38.9 IN ADULT: ICD-10-CM

## 2025-08-19 DIAGNOSIS — K21.9 GASTROESOPHAGEAL REFLUX DISEASE WITHOUT ESOPHAGITIS: ICD-10-CM

## 2025-08-19 PROCEDURE — 3008F BODY MASS INDEX DOCD: CPT | Performed by: PHYSICIAN ASSISTANT

## 2025-08-19 PROCEDURE — 99212 OFFICE O/P EST SF 10 MIN: CPT | Performed by: PHYSICIAN ASSISTANT

## 2025-08-19 PROCEDURE — 3074F SYST BP LT 130 MM HG: CPT | Performed by: PHYSICIAN ASSISTANT

## 2025-08-19 PROCEDURE — 3078F DIAST BP <80 MM HG: CPT | Performed by: PHYSICIAN ASSISTANT

## 2025-08-19 PROCEDURE — 1036F TOBACCO NON-USER: CPT | Performed by: PHYSICIAN ASSISTANT

## 2025-08-19 RX ORDER — SEMAGLUTIDE 2.4 MG/.75ML
2.4 INJECTION, SOLUTION SUBCUTANEOUS
Qty: 2 ML | Refills: 3 | Status: SHIPPED | OUTPATIENT
Start: 2025-08-19

## 2025-08-19 RX ORDER — OMEPRAZOLE 40 MG/1
40 CAPSULE, DELAYED RELEASE ORAL
Qty: 90 CAPSULE | Refills: 3 | Status: SHIPPED | OUTPATIENT
Start: 2025-08-19

## 2025-08-22 LAB — BODY SURFACE AREA: 2.18 M2

## 2025-11-18 ENCOUNTER — APPOINTMENT (OUTPATIENT)
Dept: CARDIOLOGY | Facility: CLINIC | Age: 55
End: 2025-11-18
Payer: COMMERCIAL

## 2026-02-24 ENCOUNTER — APPOINTMENT (OUTPATIENT)
Dept: PRIMARY CARE | Facility: CLINIC | Age: 56
End: 2026-02-24
Payer: COMMERCIAL